# Patient Record
Sex: FEMALE | Race: OTHER | Employment: FULL TIME | URBAN - METROPOLITAN AREA
[De-identification: names, ages, dates, MRNs, and addresses within clinical notes are randomized per-mention and may not be internally consistent; named-entity substitution may affect disease eponyms.]

---

## 2018-05-15 ENCOUNTER — OFFICE VISIT (OUTPATIENT)
Dept: OBGYN CLINIC | Facility: CLINIC | Age: 43
End: 2018-05-15

## 2018-05-15 VITALS
BODY MASS INDEX: 33.26 KG/M2 | HEIGHT: 59 IN | DIASTOLIC BLOOD PRESSURE: 64 MMHG | SYSTOLIC BLOOD PRESSURE: 118 MMHG | WEIGHT: 165 LBS

## 2018-05-15 DIAGNOSIS — Z12.39 BREAST CANCER SCREENING: ICD-10-CM

## 2018-05-15 DIAGNOSIS — R10.2 PELVIC PAIN: Primary | ICD-10-CM

## 2018-05-15 PROCEDURE — 87086 URINE CULTURE/COLONY COUNT: CPT | Performed by: OBSTETRICS & GYNECOLOGY

## 2018-05-15 PROCEDURE — 87624 HPV HI-RISK TYP POOLED RSLT: CPT | Performed by: OBSTETRICS & GYNECOLOGY

## 2018-05-15 PROCEDURE — 88175 CYTOPATH C/V AUTO FLUID REDO: CPT | Performed by: OBSTETRICS & GYNECOLOGY

## 2018-05-15 PROCEDURE — 81001 URINALYSIS AUTO W/SCOPE: CPT | Performed by: OBSTETRICS & GYNECOLOGY

## 2018-05-15 PROCEDURE — 99204 OFFICE O/P NEW MOD 45 MIN: CPT | Performed by: OBSTETRICS & GYNECOLOGY

## 2018-05-15 NOTE — PROGRESS NOTES
GYN H&P     5/15/2018  10:10 AM    CC: Patient is here for evaluation of pelvic pain. Referred by internet.     HPI: Patient is a 40year old  for episodes pelvic pressure in vagina which has recurred 3 x \"like someone pushing with a fist.\" This fi Delivery Anes PTL Lv   1  02/20/15 25w0d  1 lb 9 oz F NORMAL SPONT  Y VAZQUEZ      Complications:  labor      Obstetric Comments   Cervical incompetence       GYN hx:    Hx Prior Abnormal Pap: Yes  Pap Result Notes: 2017 PAP = ABNORMAL CELLS W BMI 33.33 kg/m²       Exam:   GENERAL: well developed, well nourished, in no apparent distress  SKIN: no rashes, no suspicious lesions  HEENT: normal  NECK: supple; no thyroidmegaly, no adenopathy  BREASTS: symmetrical, nontender, no palpable masses or nod

## 2018-05-17 ENCOUNTER — TELEPHONE (OUTPATIENT)
Dept: OBGYN CLINIC | Facility: CLINIC | Age: 43
End: 2018-05-17

## 2018-05-17 DIAGNOSIS — R10.2 PELVIC PAIN: Primary | ICD-10-CM

## 2018-05-17 RX ORDER — SULFAMETHOXAZOLE AND TRIMETHOPRIM 800; 160 MG/1; MG/1
1 TABLET ORAL 2 TIMES DAILY
Qty: 6 TABLET | Refills: 0 | Status: SHIPPED | OUTPATIENT
Start: 2018-05-17 | End: 2018-05-20

## 2018-05-17 NOTE — TELEPHONE ENCOUNTER
----- Message from Alba Leos MD sent at 5/15/2018 11:06 PM CDT -----  This looks like a UTI. She needs bactrim DS I po bid x 3 days.

## 2018-05-17 NOTE — TELEPHONE ENCOUNTER
Spoke with pt results of abnormal urine given and explained need for treatment. Results of normal pap given to pt.  rx for bactrim sent to pharmacy

## 2018-06-02 ENCOUNTER — HOSPITAL ENCOUNTER (OUTPATIENT)
Dept: MAMMOGRAPHY | Age: 43
Discharge: HOME | End: 2018-06-02
Attending: OBSTETRICS & GYNECOLOGY
Payer: COMMERCIAL

## 2018-06-02 DIAGNOSIS — Z12.39 BREAST CANCER SCREENING: ICD-10-CM

## 2018-06-02 PROCEDURE — 77067 SCR MAMMO BI INCL CAD: CPT | Performed by: OBSTETRICS & GYNECOLOGY

## 2018-06-02 PROCEDURE — 77063 BREAST TOMOSYNTHESIS BI: CPT | Performed by: OBSTETRICS & GYNECOLOGY

## 2018-06-09 ENCOUNTER — HOSPITAL ENCOUNTER (OUTPATIENT)
Dept: ULTRASOUND IMAGING | Age: 43
Discharge: HOME | End: 2018-06-09
Attending: OBSTETRICS & GYNECOLOGY
Payer: COMMERCIAL

## 2018-06-09 DIAGNOSIS — R10.2 PELVIC PAIN: ICD-10-CM

## 2018-06-09 PROCEDURE — 76856 US EXAM PELVIC COMPLETE: CPT | Performed by: OBSTETRICS & GYNECOLOGY

## 2018-06-09 PROCEDURE — 76830 TRANSVAGINAL US NON-OB: CPT | Performed by: OBSTETRICS & GYNECOLOGY

## 2018-06-10 ENCOUNTER — PATIENT MESSAGE (OUTPATIENT)
Dept: OBGYN CLINIC | Facility: CLINIC | Age: 43
End: 2018-06-10

## 2018-06-11 ENCOUNTER — TELEPHONE (OUTPATIENT)
Dept: OBGYN CLINIC | Facility: CLINIC | Age: 43
End: 2018-06-11

## 2018-06-11 RX ORDER — CIPROFLOXACIN 250 MG/1
500 TABLET, FILM COATED ORAL 2 TIMES DAILY
Qty: 6 TABLET | Refills: 0 | Status: SHIPPED | OUTPATIENT
Start: 2018-06-11 | End: 2018-06-14

## 2018-06-11 NOTE — TELEPHONE ENCOUNTER
Called and dw pt She had c/o urinary frequency, pelvic pressure and pain with urination for 1 week. She feels like she has to lay down or else she will have pain. RX sent in for cipro. DW pt if not improved in a week to FU.

## 2018-06-11 NOTE — TELEPHONE ENCOUNTER
From: Michell Campo  To: Walt Valerio MD  Sent: 6/10/2018 7:39 PM CDT  Subject: Test Results Question    I'm not feeling well!  I'm getting a lot of pressure down there (abdominal, pubic and vagina area) I have been lying down for 2 days

## 2018-06-11 NOTE — TELEPHONE ENCOUNTER
Patient had ultrasound on Saturday and was laid up all weekend. She feels a lot of pressure, lower abdominal and vaginal pressure. Unable to go to work today.

## 2018-06-11 NOTE — TELEPHONE ENCOUNTER
I'm not feeling well!  I'm getting a lot of pressure down there (abdominal, pubic and vagina area)  I have been lying down for 2 days.  I'm not going into work tomorrow. Asim Torrez let me know what the ultrasound results are. Michael Revering you!  [phone]. Message s

## 2018-12-11 ENCOUNTER — HOSPITAL ENCOUNTER (OUTPATIENT)
Age: 43
Discharge: HOME | End: 2018-12-11
Attending: FAMILY MEDICINE
Payer: COMMERCIAL

## 2018-12-11 VITALS
DIASTOLIC BLOOD PRESSURE: 76 MMHG | TEMPERATURE: 98 F | RESPIRATION RATE: 20 BRPM | HEIGHT: 59 IN | SYSTOLIC BLOOD PRESSURE: 114 MMHG | WEIGHT: 160 LBS | OXYGEN SATURATION: 97 % | BODY MASS INDEX: 32.25 KG/M2 | HEART RATE: 84 BPM

## 2018-12-11 DIAGNOSIS — R10.2 SUPRAPUBIC PRESSURE: Primary | ICD-10-CM

## 2018-12-11 PROCEDURE — 99214 OFFICE O/P EST MOD 30 MIN: CPT

## 2018-12-11 PROCEDURE — 87086 URINE CULTURE/COLONY COUNT: CPT | Performed by: FAMILY MEDICINE

## 2018-12-11 PROCEDURE — 81003 URINALYSIS AUTO W/O SCOPE: CPT

## 2018-12-11 PROCEDURE — 81025 URINE PREGNANCY TEST: CPT

## 2018-12-11 PROCEDURE — 99203 OFFICE O/P NEW LOW 30 MIN: CPT

## 2018-12-11 NOTE — ED PROVIDER NOTES
Patient presents with:  Urinary Symptoms (urologic)    HPI:     Susan Keenan is a 44year old female who presents with a chief complaint of suprapubic pressure, urgency of urination  Onset of symptoms: 2 days ago  Denies dysuria, hematuria, flank michelle Negative Negative    Blood, Urine Negative Negative    Nitrite Urine Negative Negative    Urobilinogen urine <2.0 <2.0 mg/dL    Leukocyte esterase urine Trace (A) Negative       Diagnosis:    ICD-10-CM    1.  Suprapubic pressure R10.2          Plan:    [place]de

## 2018-12-11 NOTE — ED INITIAL ASSESSMENT (HPI)
Pt to IC with urinary urgency and frequency for 3 days. Occasional abdominal discomfort. Denies fever. Pt requesting note to return to work.

## 2018-12-12 ENCOUNTER — HOSPITAL ENCOUNTER (OUTPATIENT)
Age: 43
Discharge: ANOTHER HOSPITAL | End: 2018-12-12
Payer: COMMERCIAL

## 2018-12-12 VITALS
WEIGHT: 160 LBS | RESPIRATION RATE: 18 BRPM | HEART RATE: 78 BPM | DIASTOLIC BLOOD PRESSURE: 74 MMHG | TEMPERATURE: 98 F | BODY MASS INDEX: 32 KG/M2 | OXYGEN SATURATION: 96 % | SYSTOLIC BLOOD PRESSURE: 113 MMHG

## 2018-12-12 DIAGNOSIS — R10.9 ABDOMINAL PAIN, ACUTE: Primary | ICD-10-CM

## 2018-12-12 PROCEDURE — 99212 OFFICE O/P EST SF 10 MIN: CPT

## 2018-12-12 NOTE — ED PROVIDER NOTES
Patient presents with:  Abdomen/Flank Pain (GI/)      HPI:     Antonio Donato is a 44year old female who presents with a chief complaint of epigastric pain that started a couple days ago. The patient states she has been nauseated.   The nausea is worse after ea Comment: SOCIALLY      Drug use: No      Sexual activity: Yes        Partners: Male    Other Topics      Concerns:        Not on file    Social History Narrative      Lives with male partner who she has been with for 8 year      No h/o abuse      ROS:   Po today.    Follow Up with:  No follow-up provider specified.

## 2019-01-25 ENCOUNTER — OFFICE VISIT (OUTPATIENT)
Dept: OBGYN CLINIC | Facility: CLINIC | Age: 44
End: 2019-01-25
Payer: COMMERCIAL

## 2019-01-25 VITALS
WEIGHT: 163.5 LBS | HEIGHT: 59 IN | BODY MASS INDEX: 32.96 KG/M2 | DIASTOLIC BLOOD PRESSURE: 80 MMHG | SYSTOLIC BLOOD PRESSURE: 120 MMHG

## 2019-01-25 DIAGNOSIS — N90.89 VULVAR LESION: Primary | ICD-10-CM

## 2019-01-25 PROCEDURE — 99214 OFFICE O/P EST MOD 30 MIN: CPT | Performed by: OBSTETRICS & GYNECOLOGY

## 2019-01-25 PROCEDURE — 56405 I&D VULVA/PERINEAL ABSCESS: CPT | Performed by: OBSTETRICS & GYNECOLOGY

## 2019-01-25 RX ORDER — DOXYCYCLINE HYCLATE 100 MG
100 TABLET ORAL 2 TIMES DAILY
Qty: 14 TABLET | Refills: 0 | Status: SHIPPED | OUTPATIENT
Start: 2019-01-25 | End: 2019-02-01

## 2019-01-25 NOTE — PROGRESS NOTES
CC: Patient is here for bump on labia. HPI: Patient is a 44year old  presents with bump on labia for 6 days which originally was hard and now has softened. She had a lot of pain with sitting/walking. Area is still swollen and tender.  She does h Financial resource strain: Not on file      Food insecurity - worry: Not on file      Food insecurity - inability: Not on file      Transportation needs - medical: Not on file      Transportation needs - non-medical: Not on file    Occupational History follicle. Normal appearance with no masses. Normal arterial and venous flow and waveforms are seen in the right ovary. LEFT:                 Normal appearance with no masses. Normal arterial and venous flow and waveforms are seen in the left ovary.

## 2019-07-29 ENCOUNTER — HOSPITAL ENCOUNTER (OUTPATIENT)
Age: 44
Discharge: HOME | End: 2019-07-29
Attending: EMERGENCY MEDICINE
Payer: COMMERCIAL

## 2019-07-29 VITALS
RESPIRATION RATE: 18 BRPM | TEMPERATURE: 97 F | BODY MASS INDEX: 30.24 KG/M2 | SYSTOLIC BLOOD PRESSURE: 131 MMHG | OXYGEN SATURATION: 97 % | HEART RATE: 72 BPM | WEIGHT: 150 LBS | DIASTOLIC BLOOD PRESSURE: 82 MMHG | HEIGHT: 59 IN

## 2019-07-29 DIAGNOSIS — H10.33 ACUTE CONJUNCTIVITIS OF BOTH EYES, UNSPECIFIED ACUTE CONJUNCTIVITIS TYPE: Primary | ICD-10-CM

## 2019-07-29 PROCEDURE — 99213 OFFICE O/P EST LOW 20 MIN: CPT

## 2019-07-29 PROCEDURE — 99214 OFFICE O/P EST MOD 30 MIN: CPT

## 2019-07-29 RX ORDER — POLYMYXIN B SULFATE AND TRIMETHOPRIM 1; 10000 MG/ML; [USP'U]/ML
1 SOLUTION OPHTHALMIC EVERY 6 HOURS
Qty: 10 ML | Refills: 0 | Status: SHIPPED | OUTPATIENT
Start: 2019-07-29 | End: 2019-08-03

## 2019-07-29 RX ORDER — KETOTIFEN FUMARATE 0.35 MG/ML
1 SOLUTION/ DROPS OPHTHALMIC 2 TIMES DAILY
Qty: 1 BOTTLE | Refills: 0 | Status: SHIPPED | OUTPATIENT
Start: 2019-07-29 | End: 2020-02-04

## 2019-07-29 NOTE — ED PROVIDER NOTES
Patient Seen in: [facility] In [address] [place] [address]    History   Patient presents with: Eye Visual Problem (opthalmic)    Stated Complaint: eye problem     HPI    Pt complains of red B/L eye  for 5 days . NO trauma. NO uri symptoms.    no visual and agreed except as otherwise stated in HPI.     Physical Exam     ED Triage Vitals [07/29/19 1707]   /82   Pulse 72   Resp 18   Temp 97.4 °F (36.3 °C)   Temp src Oral   SpO2 97 %   O2 Device None (Room air)       Current:/82   Pulse 72   Temp

## 2019-07-29 NOTE — ED INITIAL ASSESSMENT (HPI)
PT COMPLAINING OF REDNESS IN BOTH EYES SINCE Thursday. PT STATES IT STARTED ON THE RIGHT EYE.  +ITCHINESS AND IRRITATION. NO CONTACTS OR GLASSES.

## 2019-09-16 ENCOUNTER — APPOINTMENT (OUTPATIENT)
Dept: OCCUPATIONAL MEDICINE | Age: 44
End: 2019-09-16
Attending: FAMILY MEDICINE

## 2019-09-16 ENCOUNTER — HOSPITAL ENCOUNTER (OUTPATIENT)
Age: 44
Discharge: HOME | End: 2019-09-16
Attending: EMERGENCY MEDICINE
Payer: COMMERCIAL

## 2019-09-16 VITALS
WEIGHT: 167 LBS | TEMPERATURE: 98 F | RESPIRATION RATE: 18 BRPM | BODY MASS INDEX: 34 KG/M2 | HEART RATE: 71 BPM | DIASTOLIC BLOOD PRESSURE: 80 MMHG | SYSTOLIC BLOOD PRESSURE: 117 MMHG | OXYGEN SATURATION: 99 %

## 2019-09-16 DIAGNOSIS — B35.0 TINEA CAPITIS: Primary | ICD-10-CM

## 2019-09-16 PROCEDURE — 99214 OFFICE O/P EST MOD 30 MIN: CPT

## 2019-09-16 PROCEDURE — 99213 OFFICE O/P EST LOW 20 MIN: CPT

## 2019-09-16 RX ORDER — LORATADINE 10 MG/1
10 TABLET ORAL DAILY
COMMUNITY
End: 2020-02-04

## 2019-09-16 RX ORDER — PREDNISONE 20 MG/1
20 TABLET ORAL DAILY
Qty: 5 TABLET | Refills: 0 | Status: SHIPPED | OUTPATIENT
Start: 2019-09-16 | End: 2019-09-21

## 2019-09-16 NOTE — ED PROVIDER NOTES
Patient Seen in: [facility]mediate [facility] In [address] [place] [address]    History   Patient presents with:  Rash Skin Problem (integumentary)    Stated Complaint: redness/itching to scalp    HPI    Patient is a 60-year-old female with no significant past medical (Oral)   Resp 18   Wt 75.8 kg   LMP 09/11/2019 (Exact Date)   SpO2 99%   Breastfeeding?  No   BMI 33.73 kg/m²         Physical Exam    Constitutional: Well-developed well-nourished in no acute distress  Head: Normocephalic, no swelling or tenderness  Eyes:

## 2019-09-16 NOTE — ED INITIAL ASSESSMENT (HPI)
Pt to IC with itching rash on scalp and back of neck x 3 weeks. States she used hair dye approximately one month ago.

## 2019-09-18 ENCOUNTER — APPOINTMENT (OUTPATIENT)
Dept: OCCUPATIONAL MEDICINE | Age: 44
End: 2019-09-18
Attending: FAMILY MEDICINE

## 2020-02-04 ENCOUNTER — APPOINTMENT (OUTPATIENT)
Dept: CT IMAGING | Age: 45
End: 2020-02-04
Attending: EMERGENCY MEDICINE
Payer: COMMERCIAL

## 2020-02-04 ENCOUNTER — HOSPITAL ENCOUNTER (OUTPATIENT)
Age: 45
Discharge: HOME | End: 2020-02-04
Attending: EMERGENCY MEDICINE
Payer: COMMERCIAL

## 2020-02-04 VITALS
BODY MASS INDEX: 33.26 KG/M2 | OXYGEN SATURATION: 98 % | DIASTOLIC BLOOD PRESSURE: 84 MMHG | WEIGHT: 165 LBS | SYSTOLIC BLOOD PRESSURE: 133 MMHG | RESPIRATION RATE: 16 BRPM | HEIGHT: 59 IN | HEART RATE: 75 BPM | TEMPERATURE: 98 F

## 2020-02-04 DIAGNOSIS — S06.0X0A CONCUSSION WITHOUT LOSS OF CONSCIOUSNESS, INITIAL ENCOUNTER: Primary | ICD-10-CM

## 2020-02-04 PROCEDURE — 99214 OFFICE O/P EST MOD 30 MIN: CPT

## 2020-02-04 PROCEDURE — 70450 CT HEAD/BRAIN W/O DYE: CPT | Performed by: EMERGENCY MEDICINE
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
## 2020-02-04 NOTE — ED NOTES
Dr. Meyers First spoke with patient with final CT result. Idalia RN will be printing d/c papers for patient.
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
## 2020-05-05 ENCOUNTER — APPOINTMENT (OUTPATIENT)
Dept: GENERAL RADIOLOGY | Age: 45
End: 2020-05-05
Attending: EMERGENCY MEDICINE
Payer: COMMERCIAL

## 2020-05-05 ENCOUNTER — HOSPITAL ENCOUNTER (OUTPATIENT)
Age: 45
Discharge: HOME | End: 2020-05-05
Attending: EMERGENCY MEDICINE
Payer: COMMERCIAL

## 2020-05-05 VITALS
OXYGEN SATURATION: 98 % | HEART RATE: 70 BPM | SYSTOLIC BLOOD PRESSURE: 127 MMHG | TEMPERATURE: 98 F | RESPIRATION RATE: 16 BRPM | DIASTOLIC BLOOD PRESSURE: 70 MMHG

## 2020-05-05 DIAGNOSIS — S33.5XXA LUMBAR SPRAIN, INITIAL ENCOUNTER: ICD-10-CM
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
## 2020-05-05 DIAGNOSIS — R73.09 ELEVATED RANDOM BLOOD GLUCOSE LEVEL: ICD-10-CM

## 2020-05-05 PROCEDURE — 82962 GLUCOSE BLOOD TEST: CPT

## 2020-05-05 PROCEDURE — 72100 X-RAY EXAM L-S SPINE 2/3 VWS: CPT | Performed by: EMERGENCY MEDICINE

## 2020-05-05 PROCEDURE — 99213 OFFICE O/P EST LOW 20 MIN: CPT

## 2020-05-05 PROCEDURE — 81025 URINE PREGNANCY TEST: CPT

## 2020-05-05 PROCEDURE — 81002 URINALYSIS NONAUTO W/O SCOPE: CPT
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
## 2020-11-19 ENCOUNTER — HOSPITAL ENCOUNTER (OUTPATIENT)
Age: 45
Discharge: HOME | End: 2020-11-19
Attending: EMERGENCY MEDICINE
Payer: COMMERCIAL

## 2020-11-19 VITALS
BODY MASS INDEX: 33.26 KG/M2 | DIASTOLIC BLOOD PRESSURE: 80 MMHG | OXYGEN SATURATION: 99 % | SYSTOLIC BLOOD PRESSURE: 132 MMHG | HEIGHT: 59 IN | RESPIRATION RATE: 18 BRPM | WEIGHT: 165 LBS | HEART RATE: 83 BPM | TEMPERATURE: 98 F

## 2020-11-19 DIAGNOSIS — Z20.822 ENCOUNTER FOR SCREENING LABORATORY TESTING FOR COVID-19 VIRUS: Primary | ICD-10-CM

## 2020-11-19 PROCEDURE — 99213 OFFICE O/P EST LOW 20 MIN: CPT | Performed by: EMERGENCY MEDICINE

## 2020-11-19 NOTE — ED PROVIDER NOTES
Patient Seen in: [facility] [place]    History   No chief complaint on file. Stated Complaint: congested chest/body aches/wheezing    HPI    Patient here with cough, congestion for 2 days. No travel, no known sick contacts.   Patient denies sig sh 97.7 °F (36.5 °C)   Temp src Temporal   SpO2 99 %   O2 Device None (Room air)       Current:/80   Pulse 83   Temp 97.7 °F (36.5 °C) (Temporal)   Resp 18   Ht 149.9 cm (4' 11\")   Wt 74.8 kg   LMP 04/05/2020 (Exact Date)   SpO2 99%   BMI 33.33 kg/m²

## 2020-11-23 ENCOUNTER — APPOINTMENT (OUTPATIENT)
Dept: GENERAL RADIOLOGY | Age: 45
End: 2020-11-23
Attending: FAMILY MEDICINE
Payer: COMMERCIAL

## 2020-11-23 ENCOUNTER — HOSPITAL ENCOUNTER (OUTPATIENT)
Age: 45
Discharge: HOME | End: 2020-11-23
Attending: FAMILY MEDICINE
Payer: COMMERCIAL

## 2020-11-23 VITALS
TEMPERATURE: 98 F | SYSTOLIC BLOOD PRESSURE: 120 MMHG | WEIGHT: 165 LBS | HEIGHT: 59 IN | RESPIRATION RATE: 18 BRPM | BODY MASS INDEX: 33.26 KG/M2 | DIASTOLIC BLOOD PRESSURE: 81 MMHG | OXYGEN SATURATION: 100 % | HEART RATE: 102 BPM

## 2020-11-23 DIAGNOSIS — R05.9 COUGH: Primary | ICD-10-CM

## 2020-11-23 DIAGNOSIS — J40 BRONCHITIS: ICD-10-CM

## 2020-11-23 PROCEDURE — 99213 OFFICE O/P EST LOW 20 MIN: CPT | Performed by: FAMILY MEDICINE

## 2020-11-23 PROCEDURE — 71046 X-RAY EXAM CHEST 2 VIEWS: CPT | Performed by: FAMILY MEDICINE

## 2020-11-23 RX ORDER — ALBUTEROL SULFATE 90 UG/1
2 AEROSOL, METERED RESPIRATORY (INHALATION) EVERY 4 HOURS PRN
Qty: 1 INHALER | Refills: 0 | Status: SHIPPED | OUTPATIENT
Start: 2020-11-23 | End: 2020-12-23

## 2020-11-23 RX ORDER — BENZONATATE 100 MG/1
100 CAPSULE ORAL 3 TIMES DAILY PRN
Qty: 30 CAPSULE | Refills: 0 | Status: SHIPPED | OUTPATIENT
Start: 2020-11-23 | End: 2020-12-23

## 2020-11-24 NOTE — ED INITIAL ASSESSMENT (HPI)
Pt presents to the IC with c/o chest tightness, sob, wheezing and fatigue. Pt was seen last Thursday and tested for covid with negative results. Pt notes she feels the same, no worse and no better.

## 2020-11-24 NOTE — ED PROVIDER NOTES
Patient Seen in: [facility] [place]      History   Patient presents with:  Chest Pain    Stated Complaint: Chest tightness    HPI    Pt is a 42 yo with a viral syndrome. Negative for covid. Last week. Now has some chest tightness at times.  Not taking intact. Pupils: Pupils are equal, round, and reactive to light. Neck:      Musculoskeletal: Normal range of motion and neck supple. Cardiovascular:      Rate and Rhythm: Normal rate and regular rhythm. Heart sounds: Normal heart sounds.    Pul

## 2021-08-11 ENCOUNTER — APPOINTMENT (OUTPATIENT)
Dept: CT IMAGING | Age: 46
End: 2021-08-11
Attending: PHYSICIAN ASSISTANT
Payer: COMMERCIAL

## 2021-08-11 ENCOUNTER — HOSPITAL ENCOUNTER (OUTPATIENT)
Age: 46
Discharge: HOME | End: 2021-08-11
Attending: PHYSICIAN ASSISTANT
Payer: COMMERCIAL

## 2021-08-11 VITALS
DIASTOLIC BLOOD PRESSURE: 80 MMHG | SYSTOLIC BLOOD PRESSURE: 122 MMHG | RESPIRATION RATE: 18 BRPM | BODY MASS INDEX: 32.25 KG/M2 | WEIGHT: 160 LBS | HEART RATE: 87 BPM | OXYGEN SATURATION: 100 % | HEIGHT: 59 IN | TEMPERATURE: 98 F

## 2021-08-11 DIAGNOSIS — R93.5 ABNORMAL ABDOMINAL CT SCAN: ICD-10-CM

## 2021-08-11 DIAGNOSIS — Z87.898 HISTORY OF URINARY RETENTION: ICD-10-CM

## 2021-08-11 DIAGNOSIS — R10.9 ABDOMINAL PAIN, ACUTE: Primary | ICD-10-CM

## 2021-08-11 LAB
#MXD IC: 0.6 X10ˆ3/UL (ref 0.1–1)
B-HCG UR QL: NEGATIVE
BILIRUB UR QL STRIP: NEGATIVE
BUN BLD-MCNC: 14 MG/DL (ref 7–18)
CHLORIDE BLD-SCNC: 102 MMOL/L (ref 98–112)
CLARITY UR: CLEAR
CO2 BLD-SCNC: 23 MMOL/L (ref 21–32)
COLOR UR: YELLOW
CREAT BLD-MCNC: 0.6 MG/DL
GLUCOSE BLD-MCNC: 171 MG/DL (ref 70–99)
GLUCOSE UR STRIP-MCNC: NEGATIVE MG/DL
HCT VFR BLD AUTO: 42.5 %
HCT VFR BLD CALC: 46 %
HGB BLD-MCNC: 14.1 G/DL
HGB UR QL STRIP: NEGATIVE
ISTAT IONIZED CALCIUM FOR CHEM 8: 1.2 MMOL/L (ref 1.12–1.32)
KETONES UR STRIP-MCNC: NEGATIVE MG/DL
LEUKOCYTE ESTERASE UR QL STRIP: NEGATIVE
LYMPHOCYTES # BLD AUTO: 3 X10ˆ3/UL (ref 1–4)
LYMPHOCYTES NFR BLD AUTO: 37.9 %
MCH RBC QN AUTO: 29.9 PG (ref 26–34)
MCHC RBC AUTO-ENTMCNC: 33.2 G/DL (ref 31–37)
MCV RBC AUTO: 90 FL (ref 80–100)
MIXED CELL %: 7.8 %
NEUTROPHILS # BLD AUTO: 4.2 X10ˆ3/UL (ref 1.5–7.7)
NEUTROPHILS NFR BLD AUTO: 54.3 %
NITRITE UR QL STRIP: NEGATIVE
PH UR STRIP: 5.5 [PH]
PLATELET # BLD AUTO: 198 X10ˆ3/UL (ref 150–450)
POTASSIUM BLD-SCNC: 4.2 MMOL/L (ref 3.6–5.1)
PROT UR STRIP-MCNC: NEGATIVE MG/DL
RBC # BLD AUTO: 4.72 X10ˆ6/UL
SODIUM BLD-SCNC: 138 MMOL/L (ref 136–145)
SP GR UR STRIP: 1.02
UROBILINOGEN UR STRIP-ACNC: <2 MG/DL
WBC # BLD AUTO: 7.8 X10ˆ3/UL (ref 4–11)

## 2021-08-11 PROCEDURE — 74177 CT ABD & PELVIS W/CONTRAST: CPT | Performed by: PHYSICIAN ASSISTANT

## 2021-08-11 PROCEDURE — 81002 URINALYSIS NONAUTO W/O SCOPE: CPT | Performed by: PHYSICIAN ASSISTANT

## 2021-08-11 PROCEDURE — 85025 COMPLETE CBC W/AUTO DIFF WBC: CPT | Performed by: PHYSICIAN ASSISTANT

## 2021-08-11 PROCEDURE — 99213 OFFICE O/P EST LOW 20 MIN: CPT | Performed by: PHYSICIAN ASSISTANT

## 2021-08-11 PROCEDURE — 36415 COLL VENOUS BLD VENIPUNCTURE: CPT | Performed by: PHYSICIAN ASSISTANT

## 2021-08-11 PROCEDURE — 81025 URINE PREGNANCY TEST: CPT | Performed by: PHYSICIAN ASSISTANT

## 2021-08-11 PROCEDURE — 80047 BASIC METABLC PNL IONIZED CA: CPT | Performed by: PHYSICIAN ASSISTANT

## 2021-08-11 NOTE — ED PROVIDER NOTES
Patient Seen in: [facility] [place]    History   Patient presents with:  Urinary Symptoms    Stated Complaint: abd pain/diff urinating/one kidney    HPI    Sherron Matthews is a 41year old female who presents with chief complaint of suprapubic and Smokeless tobacco: Never Used    Vaping Use      Vaping Use: Never used    Alcohol use: Yes      Comment: SOCIALLY    Drug use: No      Review of Systems    Positive for stated complaint: abd pain/diff urinating/one kidney  Other systems are as noted in H lymphadenopathy noted. Musculoskeletal: Back - Normal to inspection. Nontender to palpation. No CVA tenderness bilaterally. No vertebral point tenderness. Full range of motion without reported pain. No palpable muscle spasm.   Negative straight leg ra exam remained stable over serial reexaminations as previously documented. 8 cc urine obtained via post void straight cath. Results reviewed with patient. Patient states her symptoms have improved prior to discharge.     I have given the patient instructi

## 2022-01-12 ENCOUNTER — APPOINTMENT (OUTPATIENT)
Dept: GENERAL RADIOLOGY | Age: 47
End: 2022-01-12
Attending: NURSE PRACTITIONER
Payer: COMMERCIAL

## 2022-01-12 ENCOUNTER — HOSPITAL ENCOUNTER (OUTPATIENT)
Age: 47
Discharge: HOME | End: 2022-01-12
Payer: COMMERCIAL

## 2022-01-12 VITALS
SYSTOLIC BLOOD PRESSURE: 118 MMHG | TEMPERATURE: 98 F | OXYGEN SATURATION: 98 % | RESPIRATION RATE: 20 BRPM | HEART RATE: 82 BPM | DIASTOLIC BLOOD PRESSURE: 63 MMHG

## 2022-01-12 DIAGNOSIS — Z20.822 LAB TEST NEGATIVE FOR COVID-19 VIRUS: ICD-10-CM

## 2022-01-12 DIAGNOSIS — R06.02 SHORTNESS OF BREATH: ICD-10-CM

## 2022-01-12 DIAGNOSIS — R07.9 CHEST PAIN OF UNCERTAIN ETIOLOGY: Primary | ICD-10-CM

## 2022-01-12 LAB
#MXD IC: 0.5 X10ˆ3/UL (ref 0.1–1)
BUN BLD-MCNC: 15 MG/DL (ref 7–18)
CHLORIDE BLD-SCNC: 102 MMOL/L (ref 98–112)
CO2 BLD-SCNC: 23 MMOL/L (ref 21–32)
CREAT BLD-MCNC: 0.7 MG/DL
DDIMER WHOLE BLOOD: <200 NG/ML DDU (ref ?–400)
GLUCOSE BLD-MCNC: 146 MG/DL (ref 70–99)
HCT VFR BLD AUTO: 42.3 %
HCT VFR BLD CALC: 46 %
HGB BLD-MCNC: 14.2 G/DL
ISTAT IONIZED CALCIUM FOR CHEM 8: 1.17 MMOL/L (ref 1.12–1.32)
LYMPHOCYTES # BLD AUTO: 4.1 X10ˆ3/UL (ref 1–4)
LYMPHOCYTES NFR BLD AUTO: 48.8 %
MCH RBC QN AUTO: 30.3 PG (ref 26–34)
MCHC RBC AUTO-ENTMCNC: 33.6 G/DL (ref 31–37)
MCV RBC AUTO: 90.2 FL (ref 80–100)
MIXED CELL %: 5.9 %
NEUTROPHILS # BLD AUTO: 3.8 X10ˆ3/UL (ref 1.5–7.7)
NEUTROPHILS NFR BLD AUTO: 45.3 %
PLATELET # BLD AUTO: 197 X10ˆ3/UL (ref 150–450)
POTASSIUM BLD-SCNC: 3.5 MMOL/L (ref 3.6–5.1)
RBC # BLD AUTO: 4.69 X10ˆ6/UL
SARS-COV-2 RNA RESP QL NAA+PROBE: NOT DETECTED
SODIUM BLD-SCNC: 138 MMOL/L (ref 136–145)
TROPONIN I BLD-MCNC: <0.02 NG/ML
WBC # BLD AUTO: 8.4 X10ˆ3/UL (ref 4–11)

## 2022-01-12 PROCEDURE — 85378 FIBRIN DEGRADE SEMIQUANT: CPT | Performed by: NURSE PRACTITIONER

## 2022-01-12 PROCEDURE — 93005 ELECTROCARDIOGRAM TRACING: CPT

## 2022-01-12 PROCEDURE — 99215 OFFICE O/P EST HI 40 MIN: CPT

## 2022-01-12 PROCEDURE — 84484 ASSAY OF TROPONIN QUANT: CPT

## 2022-01-12 PROCEDURE — 85025 COMPLETE CBC W/AUTO DIFF WBC: CPT | Performed by: NURSE PRACTITIONER

## 2022-01-12 PROCEDURE — 99214 OFFICE O/P EST MOD 30 MIN: CPT

## 2022-01-12 PROCEDURE — 71046 X-RAY EXAM CHEST 2 VIEWS: CPT | Performed by: NURSE PRACTITIONER

## 2022-01-12 PROCEDURE — 80047 BASIC METABLC PNL IONIZED CA: CPT

## 2022-01-12 PROCEDURE — 93010 ELECTROCARDIOGRAM REPORT: CPT

## 2022-01-12 PROCEDURE — 36415 COLL VENOUS BLD VENIPUNCTURE: CPT

## 2022-01-12 PROCEDURE — 93010 ELECTROCARDIOGRAM REPORT: CPT | Performed by: NURSE PRACTITIONER

## 2022-01-12 RX ORDER — POTASSIUM CHLORIDE 20 MEQ/1
20 TABLET, EXTENDED RELEASE ORAL ONCE
Status: COMPLETED | OUTPATIENT
Start: 2022-01-12 | End: 2022-01-12

## 2022-01-13 NOTE — ED PROVIDER NOTES
Patient Seen in: [facility] [place]      History   Patient presents with:  Chest Pain    Stated Complaint: chest pain, trouble breathing- shortness of breath     Subjective:   HPI    This is a 81-year-old female presenting with chest pain and shortne (36.6 °C)   Temp src Temporal   SpO2 98 %   O2 Device None (Room air)       Current:/63   Pulse 82   Temp 97.8 °F (36.6 °C) (Temporal)   Resp 20   SpO2 98%         Physical Exam  Vitals and nursing note reviewed.    Constitutional:       Appearance: N (CPT=71046)    Result Date: 1/12/2022  CONCLUSION: Normal examination.      Dictated by (CST): Gosia Erickson MD on 1/12/2022 at 6:24 PM     Finalized by (CST): Gosia Erickson MD on 1/12/2022 at 6:25 PM                 Heart Score:    HEART Score      Title breath  Lab test negative for COVID-19 virus     Disposition:  Discharge  1/12/2022  6:32 pm    Follow-up:  Diane [facility].  [phone]    Schedule an appointment as soon as possible for a visit in 3 days

## 2024-02-02 ENCOUNTER — HOSPITAL ENCOUNTER (OUTPATIENT)
Age: 49
Discharge: HOME | End: 2024-02-02
Payer: COMMERCIAL

## 2024-02-02 ENCOUNTER — APPOINTMENT (OUTPATIENT)
Dept: GENERAL RADIOLOGY | Age: 49
End: 2024-02-02
Attending: NURSE PRACTITIONER
Payer: COMMERCIAL

## 2024-02-02 VITALS
HEART RATE: 91 BPM | OXYGEN SATURATION: 100 % | SYSTOLIC BLOOD PRESSURE: 121 MMHG | RESPIRATION RATE: 20 BRPM | DIASTOLIC BLOOD PRESSURE: 76 MMHG | TEMPERATURE: 98 F

## 2024-02-02 DIAGNOSIS — M53.3 TAIL BONE PAIN: Primary | ICD-10-CM

## 2024-02-02 PROCEDURE — 72100 X-RAY EXAM L-S SPINE 2/3 VWS: CPT | Performed by: NURSE PRACTITIONER

## 2024-02-02 PROCEDURE — 99213 OFFICE O/P EST LOW 20 MIN: CPT

## 2024-02-02 PROCEDURE — 99214 OFFICE O/P EST MOD 30 MIN: CPT

## 2024-02-02 PROCEDURE — 72220 X-RAY EXAM SACRUM TAILBONE: CPT | Performed by: NURSE PRACTITIONER

## 2024-02-02 RX ORDER — LIDOCAINE 50 MG/G
1 PATCH TOPICAL EVERY 24 HOURS
Qty: 10 PATCH | Refills: 0 | Status: SHIPPED | OUTPATIENT
Start: 2024-02-02 | End: 2024-02-12

## 2024-02-02 RX ORDER — ACETAMINOPHEN AND CODEINE PHOSPHATE 300; 30 MG/1; MG/1
1 TABLET ORAL EVERY 6 HOURS PRN
Qty: 16 TABLET | Refills: 0 | Status: SHIPPED | OUTPATIENT
Start: 2024-02-02 | End: 2024-02-06

## 2024-02-02 NOTE — ED PROVIDER NOTES
Patient Seen in: [facility] [place]      History     Chief Complaint   Patient presents with    Pain     Stated Complaint: tail bone pain due to fall    Subjective: This is a 48-year-old female, past medical history of seasonal allergies, fibroids, fatty liver, degenerative kidney atrophy, borderline diabetes, presents to immediate care after she fell down stairs 3 weeks ago.  Patient states she has since been with tailbone pain.  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  She denies any exacerbating sciatica pain, numbness, tingling, weakness.  She states her sciatica is left-sided.  She currently has no numbness tingling weakness pain to lower extremities.  No loss of bowel or bladder.  No saddle anesthesia.  Patient taking Tylenol and applying ice to moderate alleviation of symptoms.  Patient ambulatory into immediate care, gait steady, no ataxia or balance.  AOx4.  The history is provided by the patient.           Objective:   No pertinent past medical history.            No pertinent past surgical history.              No pertinent social history.            Review of Systems   Constitutional: Negative.    Gastrointestinal: Negative.    Genitourinary: Negative.    Musculoskeletal:  Positive for arthralgias, back pain and myalgias. Negative for gait problem and joint swelling.   Skin: Negative.    Neurological: Negative.        Positive for stated complaint: tail bone pain due to fall  Other systems are as noted in HPI.  Constitutional and vital signs reviewed.      All other systems reviewed and negative except as noted above.    Physical Exam     ED Triage Vitals [02/02/24 1624]   /76   Pulse 91   Resp 20   Temp 97.6 °F (36.4 °C)   Temp src Temporal   SpO2 100 %   O2 Device None (Room air)       Current:/76   Pulse 91   Temp 97.6 °F (36.4 °C) (Temporal)   Resp 20   SpO2 100%          Physical Exam  Constitutional:       General: She is not in acute distress.     Appearance: Normal appearance. She is not ill-appearing or toxic-appearing.   HENT:      Head: Normocephalic.   Musculoskeletal:         General: Tenderness present. No swelling, deformity or signs of injury.      Lumbar back: Tenderness and bony tenderness present. No swelling, signs of trauma or spasms. Positive left straight leg raise test. Negative right straight leg raise test. No scoliosis.        Back:    Skin:     General: Skin is warm and dry.      Capillary Refill: Capillary refill takes less than 2 seconds.      Findings: No bruising.   Neurological:      General: No focal deficit present.      Mental Status: She is alert and oriented to person, place, and time.               ED Course   Labs Reviewed - No data to display  XR LUMBAR SPINE (MIN 2 VIEWS) (CPT=72100)    Result Date: 2/2/2024  CONCLUSION: Normal    Dictated by (CST): Fantasma Rockwell MD on 2/02/2024 at 5:16 PM     Finalized by (CST): aFntasma Rockwell MD on 2/02/2024 at 5:17 PM          XR SACRUM + COCCYX (MIN 2 VIEWS) (CPT=72220)    Result Date: 2/2/2024  CONCLUSION: No acute fracture    Dictated by (CST): Fantasma Rockwell MD on 2/02/2024 at 4:53 PM     Finalized by (CST): Fantasma Rockwell MD on 2/02/2024 at 4:54 PM                          MDM    Differentials considered include: Lumbar compression fracture, lumbar strain, exacerbation of baseline sciatica, lumbar radiculopathy, fracture of sacrum, bruised sacrum.    X-rays obtained, negative for acute fracture.     Patient likely has bruised sacrum/coccyx with lumbar strain.      Patient neurologically intact.  Patient denies any exacerbation of her sciatic pain.  Sensation to light touch and temperature intact distally.  No loss of bowel or bladder.  No saddle anesthesia.  No suspicion for cauda equina.    Patient has degenerative kidney atrophy.  Unable to receive NSAIDs.  Will prescribe Tylenol 3 for severe pain.   Lidocaine patches prescribed as well.  Patient aware to continue with Tylenol for breakthrough pain and application.    Did educate patient that Tylenol 3 has reinjure milligrams of Tylenol in it, therefore taking extra strength Tylenol over-the-counter, just take one 500 mg tablet.    Patient is aware of signs symptoms that warrant further evaluation.                                     Medical Decision Making  Amount and/or Complexity of Data Reviewed  Radiology: ordered and independent interpretation performed.     Details: There is no lumbar fracture or coccyx fracture appreciated on independent rotation of x-ray        Disposition and Plan     Clinical Impression:  1. Tail bone pain         Disposition:  Discharge  2/2/2024  5:21 pm    Follow-up:  Dana Montenegro MD  [address]  [place] [address] [phone]    Call   This is a PCP that you can establish care with.          Medications Prescribed:  Discharge Medication List as of 2/2/2024  5:21 PM        START taking these medications    Details   lidocaine 5 % External Patch Place 1 patch onto the skin daily for 10 days., Normal, Disp-10 patch, R-0      acetaminophen-codeine 300-30 MG Oral Tab Take 1 tablet by mouth every 6 (six) hours as needed for Pain., Normal, Disp-16 tablet, R-0

## 2024-02-02 NOTE — DISCHARGE INSTRUCTIONS
As discussed, x-rays are negative for fracture.  Likely bruise coccyx.  Tylenol 3 prescribed, you may take this medication every 6 hours as needed for pain.  However, you may not work, drink, drive on this medication.  You may take Tylenol, 1 tablet extra strength Tylenol as needed for discomfort.  Please be aware that this medication has about 300 mg of Tylenol in it already.    Lidocaine patches daily as discussed.    You may use ice and or heat.    Please follow-up and establish care with PCP.

## 2024-05-09 ENCOUNTER — APPOINTMENT (OUTPATIENT)
Dept: CT IMAGING | Facility: HOSPITAL | Age: 49
End: 2024-05-09
Attending: STUDENT IN AN ORGANIZED HEALTH CARE EDUCATION/TRAINING PROGRAM

## 2024-05-09 ENCOUNTER — HOSPITAL ENCOUNTER (OUTPATIENT)
Age: 49
Discharge: ANOTHER HOSPITAL | End: 2024-05-09

## 2024-05-09 ENCOUNTER — HOSPITAL ENCOUNTER (EMERGENCY)
Facility: HOSPITAL | Age: 49
Discharge: HOME | End: 2024-05-09
Attending: STUDENT IN AN ORGANIZED HEALTH CARE EDUCATION/TRAINING PROGRAM

## 2024-05-09 VITALS
RESPIRATION RATE: 18 BRPM | OXYGEN SATURATION: 100 % | HEART RATE: 88 BPM | TEMPERATURE: 97 F | SYSTOLIC BLOOD PRESSURE: 124 MMHG | DIASTOLIC BLOOD PRESSURE: 79 MMHG

## 2024-05-09 VITALS
HEART RATE: 74 BPM | OXYGEN SATURATION: 96 % | BODY MASS INDEX: 31 KG/M2 | DIASTOLIC BLOOD PRESSURE: 79 MMHG | SYSTOLIC BLOOD PRESSURE: 117 MMHG | TEMPERATURE: 98 F | WEIGHT: 155 LBS | RESPIRATION RATE: 14 BRPM

## 2024-05-09 DIAGNOSIS — R10.11 RUQ PAIN: Primary | ICD-10-CM

## 2024-05-09 DIAGNOSIS — N30.00 ACUTE CYSTITIS WITHOUT HEMATURIA: Primary | ICD-10-CM

## 2024-05-09 LAB
ALBUMIN SERPL-MCNC: 4.4 G/DL (ref 3.2–4.8)
ALBUMIN/GLOB SERPL: 1.4 {RATIO} (ref 1–2)
ALP LIVER SERPL-CCNC: 77 U/L
ALT SERPL-CCNC: 39 U/L
ANION GAP SERPL CALC-SCNC: 7 MMOL/L (ref 0–18)
AST SERPL-CCNC: 30 U/L (ref ?–34)
BASOPHILS # BLD AUTO: 0.02 X10(3) UL (ref 0–0.2)
BASOPHILS NFR BLD AUTO: 0.3 %
BILIRUB SERPL-MCNC: 0.5 MG/DL (ref 0.3–1.2)
BILIRUB UR QL: NEGATIVE
BUN BLD-MCNC: 13 MG/DL (ref 9–23)
BUN/CREAT SERPL: 16.9 (ref 10–20)
CALCIUM BLD-MCNC: 9.2 MG/DL (ref 8.7–10.4)
CHLORIDE SERPL-SCNC: 106 MMOL/L (ref 98–112)
CO2 SERPL-SCNC: 26 MMOL/L (ref 21–32)
COLOR UR: YELLOW
CREAT BLD-MCNC: 0.77 MG/DL
DEPRECATED RDW RBC AUTO: 44.5 FL (ref 35.1–46.3)
EGFRCR SERPLBLD CKD-EPI 2021: 96 ML/MIN/1.73M2 (ref 60–?)
EOSINOPHIL # BLD AUTO: 0.07 X10(3) UL (ref 0–0.7)
EOSINOPHIL NFR BLD AUTO: 1 %
ERYTHROCYTE [DISTWIDTH] IN BLOOD BY AUTOMATED COUNT: 13.6 % (ref 11–15)
GLOBULIN PLAS-MCNC: 3.1 G/DL (ref 2–3.5)
GLUCOSE BLD-MCNC: 202 MG/DL (ref 70–99)
GLUCOSE UR-MCNC: 100 MG/DL
HCT VFR BLD AUTO: 43.1 %
HGB BLD-MCNC: 14.5 G/DL
IMM GRANULOCYTES # BLD AUTO: 0.02 X10(3) UL (ref 0–1)
IMM GRANULOCYTES NFR BLD: 0.3 %
LEUKOCYTE ESTERASE UR QL STRIP.AUTO: 500
LIPASE SERPL-CCNC: 60 U/L (ref 13–75)
LYMPHOCYTES # BLD AUTO: 2.54 X10(3) UL (ref 1–4)
LYMPHOCYTES NFR BLD AUTO: 37.4 %
MCH RBC QN AUTO: 30.2 PG (ref 26–34)
MCHC RBC AUTO-ENTMCNC: 33.6 G/DL (ref 31–37)
MCV RBC AUTO: 89.8 FL
MONOCYTES # BLD AUTO: 0.53 X10(3) UL (ref 0.1–1)
MONOCYTES NFR BLD AUTO: 7.8 %
NEUTROPHILS # BLD AUTO: 3.61 X10 (3) UL (ref 1.5–7.7)
NEUTROPHILS # BLD AUTO: 3.61 X10(3) UL (ref 1.5–7.7)
NEUTROPHILS NFR BLD AUTO: 53.2 %
NITRITE UR QL STRIP.AUTO: NEGATIVE
OSMOLALITY SERPL CALC.SUM OF ELEC: 294 MOSM/KG (ref 275–295)
PH UR: 5.5 [PH] (ref 5–8)
PLATELET # BLD AUTO: 224 10(3)UL (ref 150–450)
POTASSIUM SERPL-SCNC: 3.6 MMOL/L (ref 3.5–5.1)
PROT SERPL-MCNC: 7.5 G/DL (ref 5.7–8.2)
PROT UR-MCNC: 20 MG/DL
RBC # BLD AUTO: 4.8 X10(6)UL
SODIUM SERPL-SCNC: 139 MMOL/L (ref 136–145)
SP GR UR STRIP: >1.03 (ref 1–1.03)
UROBILINOGEN UR STRIP-ACNC: NORMAL
WBC # BLD AUTO: 6.8 X10(3) UL (ref 4–11)

## 2024-05-09 PROCEDURE — 99285 EMERGENCY DEPT VISIT HI MDM: CPT

## 2024-05-09 PROCEDURE — 74177 CT ABD & PELVIS W/CONTRAST: CPT | Performed by: STUDENT IN AN ORGANIZED HEALTH CARE EDUCATION/TRAINING PROGRAM

## 2024-05-09 PROCEDURE — 85025 COMPLETE CBC W/AUTO DIFF WBC: CPT | Performed by: STUDENT IN AN ORGANIZED HEALTH CARE EDUCATION/TRAINING PROGRAM

## 2024-05-09 PROCEDURE — 81001 URINALYSIS AUTO W/SCOPE: CPT | Performed by: STUDENT IN AN ORGANIZED HEALTH CARE EDUCATION/TRAINING PROGRAM

## 2024-05-09 PROCEDURE — 83690 ASSAY OF LIPASE: CPT | Performed by: STUDENT IN AN ORGANIZED HEALTH CARE EDUCATION/TRAINING PROGRAM

## 2024-05-09 PROCEDURE — 80053 COMPREHEN METABOLIC PANEL: CPT | Performed by: STUDENT IN AN ORGANIZED HEALTH CARE EDUCATION/TRAINING PROGRAM

## 2024-05-09 PROCEDURE — 99213 OFFICE O/P EST LOW 20 MIN: CPT

## 2024-05-09 PROCEDURE — 36415 COLL VENOUS BLD VENIPUNCTURE: CPT

## 2024-05-09 PROCEDURE — 99284 EMERGENCY DEPT VISIT MOD MDM: CPT

## 2024-05-09 PROCEDURE — 87086 URINE CULTURE/COLONY COUNT: CPT | Performed by: STUDENT IN AN ORGANIZED HEALTH CARE EDUCATION/TRAINING PROGRAM

## 2024-05-09 RX ORDER — CEPHALEXIN 500 MG/1
500 CAPSULE ORAL 2 TIMES DAILY
Qty: 14 CAPSULE | Refills: 0 | Status: SHIPPED | OUTPATIENT
Start: 2024-05-09 | End: 2024-05-16

## 2024-05-09 NOTE — ED INITIAL ASSESSMENT (HPI)
Onset of epigastric pain, generalized abdominal cramping, and fever started 3 days ago. Vomited a couple times then developed diarrhea. Fever and body aches resolved but continues to feel \"bloated\".

## 2024-05-09 NOTE — ED PROVIDER NOTES
Patient Seen in: [facility] [place]      History     Chief Complaint   Patient presents with    Fever    Abdominal Pain     Stated Complaint: stomach, fever    Subjective:   Geneva is a 48-year-old female presenting to the immediate care complaining of abdominal pain.  Patient states that 4 days ago she had subjective fever and had some upper abdominal pain.  She states that she has had a few episodes of vomiting throughout the week; she has also had multiple episodes of diarrhea throughout the week.  States that her stomach feels very bloated.  Denies any urinary symptoms.  Denies any vaginal bleeding or discharge.  No concern for STDs.  Patient has no other complaints or concerns.          Objective:   Past Medical History:    Abnormal Pap smear of cervix    Borderline diabetes    hemoglobin A1 was 6.2 4/2018    Degenerative kidney atrophy    R kidney only works 10% working diagnosed on CT scan     Fatty liver    Fibroids    diagnosed with previous pregnancy    H/O seasonal allergies    Human papilloma virus infection    on 2017 pap              Past Surgical History:   Procedure Laterality Date    Colposcopy, cervix w/upper adjacent vagina; w/biopsy(s), cervix      Reduction left      1998    Reduction of large breast      Reduction right      1998                Social History     Socioeconomic History    Marital status: Single   Occupational History    Occupation: occupational therapist     Comment: @ [employer]   Tobacco Use    Smoking status: Some Days    Smokeless tobacco: Never   Vaping Use    Vaping status: Never Used   Substance and Sexual Activity    Alcohol use: Yes     Comment: SOCIALLY    Drug use: No    Sexual activity: Yes     Partners: Male   Social History Narrative    Lives with male partner who she has been with for 8 year    No h/o abuse     Social Determinants of Health      [facility] from [employer]              Review of Systems    Positive  for stated complaint: stomach, fever  Other systems are as noted in HPI.  Constitutional and vital signs reviewed.      All other systems reviewed and negative except as noted above.    Physical Exam     ED Triage Vitals [05/09/24 0835]   /79   Pulse 88   Resp 18   Temp 97 °F (36.1 °C)   Temp src Temporal   SpO2 100 %   O2 Device None (Room air)       Current Vitals:   Vital Signs  BP: 124/79  Pulse: 88  Resp: 18  Temp: 97 °F (36.1 °C)  Temp src: Temporal    Oxygen Therapy  SpO2: 100 %  O2 Device: None (Room air)            Physical Exam  Vitals and nursing note reviewed.   Constitutional:       General: She is not in acute distress.     Appearance: Normal appearance. She is not ill-appearing, toxic-appearing or diaphoretic.   HENT:      Head: Normocephalic.   Cardiovascular:      Rate and Rhythm: Normal rate.   Pulmonary:      Effort: Pulmonary effort is normal. No respiratory distress.   Abdominal:      General: Abdomen is flat. Bowel sounds are normal. There is no distension.      Palpations: Abdomen is soft. There is no mass.      Tenderness: There is abdominal tenderness in the right upper quadrant and left lower quadrant. There is no right CVA tenderness, left CVA tenderness, guarding or rebound.      Hernia: No hernia is present.   Musculoskeletal:         General: Normal range of motion.      Cervical back: Normal range of motion.   Skin:     General: Skin is warm and dry.      Capillary Refill: Capillary refill takes less than 2 seconds.   Neurological:      General: No focal deficit present.      Mental Status: She is alert and oriented to person, place, and time.   Psychiatric:         Mood and Affect: Mood normal.         Behavior: Behavior normal.         Thought Content: Thought content normal.         Judgment: Judgment normal.              ED Course   Labs Reviewed - No data to display         MDM             Medical Decision Making  Multiple medical diagnoses were considered including but not  limited to diverticulitis, colitis, cholecystitis, GERD.  Patient is well appearing, non-toxic and in no acute distress.  Vital signs are stable.   48-year-old female presenting to the immediate care complaining of abdominal pain for the past 4 days.  Fever at the beginning of illness.  Has had diarrhea and vomiting throughout the week.  Patient has tenderness to her right upper quadrant and left lower quadrant on exam.  Given the right upper quadrant tenderness and the likely need for advanced lab testing not available here the patient will be sent to the emergency department for further evaluation and management.  Patient will go to the [place] emergency department.  Patient discussed with Dr. Robb who agrees with this plan.      Amount and/or Complexity of Data Reviewed  Labs: ordered. Decision-making details documented in ED Course.        Disposition and Plan     Clinical Impression:  1. RUQ pain         Disposition:  Ic to ed  5/9/2024  9:05 am    Follow-up:  No follow-up provider specified.        Medications Prescribed:  Discharge Medication List as of 5/9/2024  8:58 AM

## 2024-05-13 NOTE — ED PROVIDER NOTES
Patient Seen in: [facility] Emergency Department      History     Chief Complaint   Patient presents with    Abdominal Pain     Stated Complaint: abddominal pain    Subjective:   HPI    47 yo female, with recent dx of DM on metformin, presenting with abdominal pian. She describes 3 day history of lower abdominal as well as RUQ abdominal discomfort. Associated with body aches. Has had nonbloody diarrhea since initiation of metformin a few weeks ago. No fevers, no flank pain, vomiting, hematuria or vaginal bleeding/discharge. No burning with urination    Objective:   Past Medical History:    Abnormal Pap smear of cervix    Borderline diabetes    hemoglobin A1 was 6.2 4/2018    Degenerative kidney atrophy    R kidney only works 10% working diagnosed on CT scan     Diabetes (HCC)    Fatty liver    Fibroids    diagnosed with previous pregnancy    H/O seasonal allergies    Human papilloma virus infection    on 2017 pap              Past Surgical History:   Procedure Laterality Date    Colposcopy, cervix w/upper adjacent vagina; w/biopsy(s), cervix      Reduction left      1998    Reduction of large breast      Reduction right      1998                Social History     Socioeconomic History    Marital status: Single   Occupational History    Occupation: occupational therapist     Comment: @ [employer]   Tobacco Use    Smoking status: Some Days    Smokeless tobacco: Never   Vaping Use    Vaping status: Never Used   Substance and Sexual Activity    Alcohol use: Yes     Comment: SOCIALLY    Drug use: No    Sexual activity: Yes     Partners: Male   Social History Narrative    Lives with male partner who she has been with for 8 year    No h/o abuse     Social Determinants of Health      [facility] [employer] [employer]    [facility]              Review of Systems    Positive for stated complaint: abddominal pain  Other systems are as noted in HPI.  Constitutional and vital signs reviewed.       All other systems reviewed and negative except as noted above.    Physical Exam     ED Triage Vitals   BP 05/09/24 0925 146/78   Pulse 05/09/24 0925 85   Resp 05/09/24 0925 18   Temp 05/09/24 0925 98 °F (36.7 °C)   Temp src --    SpO2 05/09/24 0925 98 %   O2 Device 05/09/24 0945 None (Room air)       Current Vitals:   No data recorded        Physical Exam    Constitutional: awake, alert, no sig distress  HENT: mmm, no lesions,  Neck: normal range of motion, no tenderness, supple.  Eyes: PERRL, EOMI, conjunctiva normal, no discharge. Sclera anicteric.  Cardiovascular: rr no murmur  Respiratory: Normal breath sounds, no respiratory distress, no wheezing, no chest tenderness.  GI: Bowel sounds normal, Soft, suprapubic, LLQ and RUQ TTP, no masses, no pulsatile masses.  : No CVA tenderness.  Skin: Warm, dry, no erythema, no rash.  Musculoskeletal: Intact distal pulses, no edema, no tenderness, no cyanosis, no clubbing. Good range of motion in all major joints. No tenderness to palpation or major deformities noted. Back- No tenderness.  Neurologic: Alert & oriented x 3, normal motor function, normal sensory function, no focal deficits noted.  Psych: Calm, cooperative, nl affect        ED Course     Labs Reviewed   COMP METABOLIC PANEL (14) - Abnormal; Notable for the following components:       Result Value    Glucose 202 (*)     All other components within normal limits   URINALYSIS WITH CULTURE REFLEX - Abnormal; Notable for the following components:    Clarity Urine Turbid (*)     Spec Gravity >1.030 (*)     Glucose Urine 100 (*)     Ketones Urine Trace (*)     Blood Urine 2+ (*)     Protein Urine 20 (*)     Leukocyte Esterase Urine 500 (*)     WBC Urine 21-50 (*)     RBC Urine 6-10 (*)     Bacteria Urine 1+ (*)     Squamous Epi. Cells Few (*)     All other components within normal limits   LIPASE - Normal   CBC WITH DIFFERENTIAL WITH PLATELET    Narrative:     The following orders were created for panel order  CBC With Differential With Platelet.  Procedure                               Abnormality         Status                     ---------                               -----------         ------                     CBC W/ DIFFERENTIAL[[id number]]                              Final result                 Please view results for these tests on the individual orders.   RAINBOW DRAW BLUE   URINE CULTURE, ROUTINE   CBC W/ DIFFERENTIAL          ED Course as of 05/13/24 1556  ------------------------------------------------------------  Time: 05/09 1155  Comment: I have independently reviewed patient's CT abdomen and pelvis, I do not appreciate any acute intra-abdominal pathology.              MDM      48F hx as above presenting with lower and RUQ pain. On arrival, VSS, reassuring.   Ddx: Cystitis, diverticulitis, adverse reaction to metformin, sx cholelithiasis or acute cholecystitis  Lower suspicion for gynecologic pathology such as torsion, PID/toa at this time but considered at length.   Plan: labs, CT w/ con, UA, LFTs, Lipase    Labs with WBC 6.8, normal lfts and lipase, UA with 3+ LE, 21-50 WBC 1+ bacteria -  query cystitis, will start on keflex.   Discussed return precautions and follow up instructions with patient who voiced understanding and agreement with the plan. All questions answered.                                  [facility]    Disposition and Plan     Clinical Impression:  1. Acute cystitis without hematuria         Disposition:  Discharge  5/9/2024 11:56 am    Follow-up:  [facility] Emergency Department  [address] [facility] [id number] [phone]  Follow up  As needed, If symptoms worsen    Donnie Bird MD  [address] [facility] [id number] [phone]    Follow up            Medications Prescribed:  Discharge Medication List as of 5/9/2024 12:00 PM        START taking these medications    Details   cephalexin 500 MG Oral Cap Take 1 capsule (500 mg total) by mouth 2 (two) times daily  for 7 days., Normal, Disp-14 capsule, R-0

## 2024-06-12 ENCOUNTER — HOSPITAL ENCOUNTER (OUTPATIENT)
Age: 49
Discharge: ANOTHER HOSPITAL | End: 2024-06-12
Attending: STUDENT IN AN ORGANIZED HEALTH CARE EDUCATION/TRAINING PROGRAM
Payer: COMMERCIAL

## 2024-06-12 ENCOUNTER — HOSPITAL ENCOUNTER (EMERGENCY)
Facility: HOSPITAL | Age: 49
Discharge: HOME | End: 2024-06-12
Attending: EMERGENCY MEDICINE
Payer: COMMERCIAL

## 2024-06-12 VITALS
WEIGHT: 155 LBS | SYSTOLIC BLOOD PRESSURE: 114 MMHG | DIASTOLIC BLOOD PRESSURE: 75 MMHG | RESPIRATION RATE: 16 BRPM | TEMPERATURE: 97 F | HEART RATE: 68 BPM | BODY MASS INDEX: 31 KG/M2 | OXYGEN SATURATION: 98 %

## 2024-06-12 VITALS
SYSTOLIC BLOOD PRESSURE: 125 MMHG | HEART RATE: 87 BPM | TEMPERATURE: 97 F | OXYGEN SATURATION: 98 % | DIASTOLIC BLOOD PRESSURE: 88 MMHG | RESPIRATION RATE: 18 BRPM

## 2024-06-12 DIAGNOSIS — R10.30 LOWER ABDOMINAL PAIN: Primary | ICD-10-CM

## 2024-06-12 DIAGNOSIS — R82.4 KETONURIA: ICD-10-CM

## 2024-06-12 DIAGNOSIS — R73.9 HYPERGLYCEMIA: Primary | ICD-10-CM

## 2024-06-12 DIAGNOSIS — R10.9 ABDOMINAL PAIN, ACUTE: ICD-10-CM

## 2024-06-12 LAB
ANION GAP SERPL CALC-SCNC: 6 MMOL/L (ref 0–18)
B-HCG UR QL: NEGATIVE
BASOPHILS # BLD AUTO: 0.04 X10(3) UL (ref 0–0.2)
BASOPHILS NFR BLD AUTO: 0.6 %
BILIRUB UR QL STRIP: NEGATIVE
BILIRUB UR QL: NEGATIVE
BUN BLD-MCNC: 9 MG/DL (ref 9–23)
BUN/CREAT SERPL: 13 (ref 10–20)
CALCIUM BLD-MCNC: 9.6 MG/DL (ref 8.7–10.4)
CHLORIDE SERPL-SCNC: 105 MMOL/L (ref 98–112)
CLARITY UR: CLEAR
CO2 SERPL-SCNC: 25 MMOL/L (ref 21–32)
COLOR UR: COLORLESS
COLOR UR: YELLOW
CREAT BLD-MCNC: 0.69 MG/DL
DEPRECATED RDW RBC AUTO: 44.1 FL (ref 35.1–46.3)
EGFRCR SERPLBLD CKD-EPI 2021: 108 ML/MIN/1.73M2 (ref 60–?)
EOSINOPHIL # BLD AUTO: 0.08 X10(3) UL (ref 0–0.7)
EOSINOPHIL NFR BLD AUTO: 1.2 %
ERYTHROCYTE [DISTWIDTH] IN BLOOD BY AUTOMATED COUNT: 13.6 % (ref 11–15)
GLUCOSE BLD-MCNC: 186 MG/DL (ref 70–99)
GLUCOSE BLDC GLUCOMTR-MCNC: 226 MG/DL (ref 70–99)
GLUCOSE BLDC GLUCOMTR-MCNC: 226 MG/DL (ref 70–99)
GLUCOSE UR STRIP-MCNC: 250 MG/DL
GLUCOSE UR-MCNC: 100 MG/DL
HCT VFR BLD AUTO: 41.4 %
HGB BLD-MCNC: 14.4 G/DL
HGB UR QL STRIP.AUTO: NEGATIVE
HGB UR QL STRIP: NEGATIVE
IMM GRANULOCYTES # BLD AUTO: 0.02 X10(3) UL (ref 0–1)
IMM GRANULOCYTES NFR BLD: 0.3 %
KETONES UR STRIP-MCNC: 15 MG/DL
KETONES UR-MCNC: NEGATIVE MG/DL
LEUKOCYTE ESTERASE UR QL STRIP.AUTO: NEGATIVE
LEUKOCYTE ESTERASE UR QL STRIP: NEGATIVE
LYMPHOCYTES # BLD AUTO: 2.94 X10(3) UL (ref 1–4)
LYMPHOCYTES NFR BLD AUTO: 43.6 %
MCH RBC QN AUTO: 30.8 PG (ref 26–34)
MCHC RBC AUTO-ENTMCNC: 34.8 G/DL (ref 31–37)
MCV RBC AUTO: 88.7 FL
MONOCYTES # BLD AUTO: 0.46 X10(3) UL (ref 0.1–1)
MONOCYTES NFR BLD AUTO: 6.8 %
NEUTROPHILS # BLD AUTO: 3.2 X10 (3) UL (ref 1.5–7.7)
NEUTROPHILS # BLD AUTO: 3.2 X10(3) UL (ref 1.5–7.7)
NEUTROPHILS NFR BLD AUTO: 47.5 %
NITRITE UR QL STRIP.AUTO: NEGATIVE
NITRITE UR QL STRIP: NEGATIVE
OSMOLALITY SERPL CALC.SUM OF ELEC: 286 MOSM/KG (ref 275–295)
PH UR STRIP: 7 [PH]
PH UR: 6 [PH] (ref 5–8)
PLATELET # BLD AUTO: 239 10(3)UL (ref 150–450)
POTASSIUM SERPL-SCNC: 4 MMOL/L (ref 3.5–5.1)
PROT UR STRIP-MCNC: NEGATIVE MG/DL
PROT UR-MCNC: NEGATIVE MG/DL
RBC # BLD AUTO: 4.67 X10(6)UL
SODIUM SERPL-SCNC: 136 MMOL/L (ref 136–145)
SP GR UR STRIP: 1.02
SP GR UR STRIP: <1.005 (ref 1–1.03)
UROBILINOGEN UR STRIP-ACNC: <2 MG/DL
UROBILINOGEN UR STRIP-ACNC: NORMAL
WBC # BLD AUTO: 6.7 X10(3) UL (ref 4–11)

## 2024-06-12 PROCEDURE — 85025 COMPLETE CBC W/AUTO DIFF WBC: CPT | Performed by: EMERGENCY MEDICINE

## 2024-06-12 PROCEDURE — 99213 OFFICE O/P EST LOW 20 MIN: CPT

## 2024-06-12 PROCEDURE — 99284 EMERGENCY DEPT VISIT MOD MDM: CPT

## 2024-06-12 PROCEDURE — 82962 GLUCOSE BLOOD TEST: CPT

## 2024-06-12 PROCEDURE — 81025 URINE PREGNANCY TEST: CPT

## 2024-06-12 PROCEDURE — 96360 HYDRATION IV INFUSION INIT: CPT

## 2024-06-12 PROCEDURE — 80048 BASIC METABOLIC PNL TOTAL CA: CPT | Performed by: EMERGENCY MEDICINE

## 2024-06-12 PROCEDURE — 81002 URINALYSIS NONAUTO W/O SCOPE: CPT

## 2024-06-12 PROCEDURE — 81003 URINALYSIS AUTO W/O SCOPE: CPT | Performed by: EMERGENCY MEDICINE

## 2024-06-12 RX ORDER — DICYCLOMINE HYDROCHLORIDE 10 MG/1
10 CAPSULE ORAL ONCE
Status: COMPLETED | OUTPATIENT
Start: 2024-06-12 | End: 2024-06-12

## 2024-06-12 RX ORDER — DICYCLOMINE HCL 20 MG
20 TABLET ORAL 4 TIMES DAILY PRN
Qty: 30 TABLET | Refills: 0 | Status: SHIPPED | OUTPATIENT
Start: 2024-06-12 | End: 2024-07-12

## 2024-06-12 NOTE — ED PROVIDER NOTES
Patient Seen in: [facility]      History     Chief Complaint   Patient presents with    Urinary Symptoms     Stated Complaint: Uti    Subjective:   HPI    48-year-old female with past medical history of degenerative right kidney but reportedly normal kidney function and DM, who presents with concern for a few days of diffuse B/L lower abdominal pain and pressure which is consistent with previous UTI.  She denies dysuria urgency or fevers or vomiting.  She states that time she does sometimes forget to take her nightly dose of metformin.  She states she only ate popcorn last night and has not eaten anything since.  She denies any current vomiting.  She states she has had decreased oral intake as water makes her feel bloated. Of note, as confirmed by the patient as well as per chart review, the patient was assessed in the emergency department on 5/9/2024 for similar symptoms with CT scan only remarkable for incidental findings, otherwise unremarkable, and urine concerning for infection.  Patient was initiated on Keflex and patient reports resolution of her symptoms.  However, there was no growth on her urine culture.    Objective:   Past Medical History:    Abnormal Pap smear of cervix    Borderline diabetes    hemoglobin A1 was 6.2 4/2018    Degenerative kidney atrophy    R kidney only works 10% working diagnosed on CT scan     Diabetes (HCC)    Fatty liver    Fibroids    diagnosed with previous pregnancy    H/O seasonal allergies    Human papilloma virus infection    on 2017 pap              Past Surgical History:   Procedure Laterality Date    Colposcopy, cervix w/upper adjacent vagina; w/biopsy(s), cervix      Reduction left      1998    Reduction of large breast      Reduction right      1998                Social History     Socioeconomic History    Marital status: Single   Occupational History    Occupation: occupational therapist     Comment: @ [employer]   Tobacco Use    Smoking  status: Some Days    Smokeless tobacco: Never   Vaping Use    Vaping status: Never Used   Substance and Sexual Activity    Alcohol use: Yes     Comment: SOCIALLY    Drug use: No    Sexual activity: Yes     Partners: Male   Social History Narrative    Lives with male partner who she has been with for 8 year    No h/o abuse     Social Determinants of Health      [facility]              Review of Systems    Positive for stated complaint: Uti  Other systems are as noted in HPI.  Constitutional and vital signs reviewed.      All other systems reviewed and negative except as noted above.    Physical Exam     ED Triage Vitals [06/12/24 0941]   /88   Pulse 87   Resp 18   Temp 97.4 °F (36.3 °C)   Temp src Temporal   SpO2 98 %   O2 Device None (Room air)       Current Vitals:   Vital Signs  BP: 125/88  Pulse: 87  Resp: 18  Temp: 97.4 °F (36.3 °C)  Temp src: Temporal    Oxygen Therapy  SpO2: 98 %  O2 Device: None (Room air)            Physical Exam    General: Awake, alert, comfortable on room air, in no distress, tolerating oral secretions, interactive but fatigued appearing  Pulmonary: Lungs CTA B, no wheezing, no conversational dyspnea  GI: Abdomen soft, TTP of the suprapubic region and B/L lower quadrants greater on the right lower quadrant, no rebound or guarding  Neuro: symmetrical facial expressions on gross observation  Musculoskeletal: No B/L CVA tenderness  HEENT: no periorbital edema or erythema  Psych: Normal mood, normal affect    ED Course     Labs Reviewed   [facility] POCT URINALYSIS DIPSTICK - Abnormal; Notable for the following components:       Result Value    Urine Clarity Slightly cloudy (*)     Glucose, Urine 250 (*)     Ketone, Urine 15 (*)     All other components within normal limits   POCT GLUCOSE - Abnormal; Notable for the following components:    POC Glucose  226 (*)     All other components within normal limits   POCT PREGNANCY URINE - Normal         MDM    Patient is awake, alert, afebrile, in no distress, nontoxic-appearing she has diffusely tender lower quadrants concerning for possible appendicitis versus diverticulitis versus UTI, but lower suspicion for acute abdomen given on 5/9/2024, per chart review, when patient presented with similar symptoms to the emergency department, she had an unremarkable CT scan other than incidental findings, she was diagnosed with a UTI at that time with reportedly similar symptoms, but urine dip today shows no nitrites and no leukocytes and no blood with no signs of UTI on urine dip today, urine also negative for pregnancy to rule out any pregnancy related complications    Patient has glucosuria as well as ketonuria and Accu-Chek shows blood sugar of 226, given history of DM with patient missing doses of metformin and fatigued appearing with reported abdominal pain but no UTI, concern for possible early DKA, and given limitations of immediate care in assessing and treating DKA, I recommended immediate assessment in the emergency department.  The patient is agreeable and states she will go straight to the [facility] emergency department.      Disposition and Plan     Clinical Impression:  1. Hyperglycemia    2. Ketonuria    3. Abdominal pain, acute         Disposition:  Ic to ed  6/12/2024 10:29 am    Follow-up:  No follow-up provider specified.        Medications Prescribed:  Discharge Medication List as of 6/12/2024 10:30 AM        None

## 2024-06-12 NOTE — ED PROVIDER NOTES
Patient Seen in: [facility] Emergency Department      History     Chief Complaint   Patient presents with    Urinary Symptoms     Stated Complaint: MD concern for early DKA, abdominal pain    Subjective:   HPI        Objective:   Past Medical History:    Abnormal Pap smear of cervix    Borderline diabetes    hemoglobin A1 was 6.2 4/2018    Degenerative kidney atrophy    R kidney only works 10% working diagnosed on CT scan     Diabetes (HCC)    Fatty liver    Fibroids    diagnosed with previous pregnancy    H/O seasonal allergies    Human papilloma virus infection    on 2017 pap              Past Surgical History:   Procedure Laterality Date    Colposcopy, cervix w/upper adjacent vagina; w/biopsy(s), cervix      Reduction left      1998    Reduction of large breast      Reduction right      1998                Social History     Socioeconomic History    Marital status: Single   Occupational History    Occupation: occupational therapist     Comment: @ [employer]   Tobacco Use    Smoking status: Former     Types: Cigarettes    Smokeless tobacco: Never   Vaping Use    Vaping status: Never Used   Substance and Sexual Activity    Alcohol use: Yes     Comment: occ    Drug use: No    Sexual activity: Yes     Partners: Male   Social History Narrative    Lives with male partner who she has been with for 8 year    No h/o abuse     Social Determinants of Health      [facility]              Review of Systems    Positive for stated complaint: MD concern for early DKA, abdominal pain  Other systems are as noted in HPI.  Constitutional and vital signs reviewed.      All other systems reviewed and negative except as noted above.    Physical Exam     ED Triage Vitals [06/12/24 1108]   /71   Pulse 79   Resp 22   Temp 97.4 °F (36.3 °C)   Temp src Temporal   SpO2 97 %   O2 Device None (Room air)       Current Vitals:   Vital Signs  BP: 114/75  Pulse: 68  Resp:  16  Temp: 97.4 °F (36.3 °C)  Temp src: Temporal  MAP (mmHg): 88    Oxygen Therapy  SpO2: 98 %  O2 Device: None (Room air)            Physical Exam          ED Course     Labs Reviewed   URINALYSIS WITH CULTURE REFLEX - Abnormal; Notable for the following components:       Result Value    Urine Color Colorless (*)     Spec Gravity <1.005 (*)     Glucose Urine 100 (*)     All other components within normal limits   BASIC METABOLIC PANEL (8) - Abnormal; Notable for the following components:    Glucose 186 (*)     All other components within normal limits   POCT GLUCOSE - Abnormal; Notable for the following components:    POC Glucose  226 (*)     All other components within normal limits   CBC WITH DIFFERENTIAL WITH PLATELET    Narrative:     The following orders were created for panel order CBC With Differential With Platelet.  Procedure                               Abnormality         Status                     ---------                               -----------         ------                     CBC W/ DIFFERENTIAL[[id number]]                              Final result                 Please view results for these tests on the individual orders.   CBC W/ DIFFERENTIAL                      MDM      48-year-old female with a history of relatively recently diagnosed diabetes presents today with urinary frequency and abdominal discomfort.  Patient states that for the last 3 to 4 days, she has been having some lower abdominal crampy pain.  She is also been having urinary frequency but denies dysuria, fevers, nausea/vomiting, or other symptoms.  She was treated for UTI last month with Keflex which did resolve the symptoms she was having at that time.  Today, presented to an immediate care where urinalysis showed ketonuria and had a blood sugar in the mid 200s and was referred to the ER for rule out DKA.    On exam, vitals normal, well-appearing, abdomen soft with mild lower abdominal tenderness without guarding or  distention.  No CVA tenderness.  Moist mucous membranes.    Differential: UTI, hyperglycemia, enteritis    Labs performed and reassuring.  Normal anion gap and normal bicarb.  Urinalysis shows no signs of infection, ketonuria, but does have some glucose.    In the ED, patient was given IV fluid and Bentyl and on reexamination had significantly improved symptoms.  Will prescribe Bentyl for intestinal spasms at home and instructed on PMD follow-up and return precautions.                           MDM    Disposition and Plan     Clinical Impression:  1. Lower abdominal pain         Disposition:  Discharge  6/12/2024  1:05 pm    Follow-up:  Your primary care doctor    Follow up  As needed          Medications Prescribed:  Discharge Medication List as of 6/12/2024  1:15 PM        START taking these medications    Details   dicyclomine 20 MG Oral Tab Take 1 tablet (20 mg total) by mouth 4 (four) times daily as needed., Normal, Disp-30 tablet, R-0

## 2024-06-12 NOTE — ED INITIAL ASSESSMENT (HPI)
Patient c/o urinary symptoms, frequency, dysuria, lower abdominal pain since Sunday. Patient had a UTI last month. Patient was informed to come to ER as she does not have a UTI but possible DKA per IC.

## 2024-06-12 NOTE — ED INITIAL ASSESSMENT (HPI)
Patient arrives ambulatory with c/o possible UTI. Patient reports suprapubic pain, urinary frequency/retention, back pain. Does report 1 functioning kidney. Patient reports similar symptoms last month and was diagnosed with UTI. Concerned she has another UTI.

## 2025-01-30 ENCOUNTER — HOSPITAL ENCOUNTER (OUTPATIENT)
Age: 50
Discharge: HOME | End: 2025-01-30
Payer: COMMERCIAL

## 2025-01-30 VITALS
SYSTOLIC BLOOD PRESSURE: 120 MMHG | TEMPERATURE: 98 F | HEART RATE: 84 BPM | RESPIRATION RATE: 18 BRPM | OXYGEN SATURATION: 98 % | DIASTOLIC BLOOD PRESSURE: 79 MMHG

## 2025-01-30 DIAGNOSIS — G43.919 INTRACTABLE MIGRAINE WITHOUT STATUS MIGRAINOSUS, UNSPECIFIED MIGRAINE TYPE: Primary | ICD-10-CM

## 2025-01-30 PROCEDURE — 96374 THER/PROPH/DIAG INJ IV PUSH: CPT

## 2025-01-30 PROCEDURE — 96361 HYDRATE IV INFUSION ADD-ON: CPT

## 2025-01-30 PROCEDURE — 99214 OFFICE O/P EST MOD 30 MIN: CPT

## 2025-01-30 PROCEDURE — 96375 TX/PRO/DX INJ NEW DRUG ADDON: CPT

## 2025-01-30 RX ORDER — DIPHENHYDRAMINE HYDROCHLORIDE 50 MG/ML
25 INJECTION INTRAMUSCULAR; INTRAVENOUS ONCE
Status: COMPLETED | OUTPATIENT
Start: 2025-01-30 | End: 2025-01-30

## 2025-01-30 RX ORDER — KETOROLAC TROMETHAMINE 30 MG/ML
15 INJECTION, SOLUTION INTRAMUSCULAR; INTRAVENOUS ONCE
Status: COMPLETED | OUTPATIENT
Start: 2025-01-30 | End: 2025-01-30

## 2025-01-30 RX ORDER — SODIUM CHLORIDE 9 MG/ML
1000 INJECTION, SOLUTION INTRAVENOUS ONCE
Status: COMPLETED | OUTPATIENT
Start: 2025-01-30 | End: 2025-01-30

## 2025-01-30 RX ORDER — ONDANSETRON 2 MG/ML
4 INJECTION INTRAMUSCULAR; INTRAVENOUS ONCE
Status: COMPLETED | OUTPATIENT
Start: 2025-01-30 | End: 2025-01-30

## 2025-01-30 RX ORDER — ONDANSETRON 4 MG/1
4 TABLET, ORALLY DISINTEGRATING ORAL EVERY 4 HOURS PRN
Qty: 10 TABLET | Refills: 0 | Status: SHIPPED | OUTPATIENT
Start: 2025-01-30 | End: 2025-02-06

## 2025-01-30 NOTE — ED PROVIDER NOTES
Patient Seen in: [facility]      History     Chief Complaint   Patient presents with    Migraine     Stated Complaint: Headache; Nausea/Dizziness    Subjective:   HPI    49-year-old female presents with migraine headache.  She states she developed migraine symptoms 1 day ago.  She reports this is her typical migraine pain.  She usually only ever takes Tylenol but there has been no relief.  She does have nausea.  No vomiting.  No head injury.      Objective:     No pertinent past medical history.            No pertinent past surgical history.              No pertinent social history.            Review of Systems    Positive for stated complaint: Headache; Nausea/Dizziness  Other systems are as noted in HPI.  Constitutional and vital signs reviewed.      All other systems reviewed and negative except as noted above.    Physical Exam     ED Triage Vitals [01/30/25 0912]   /79   Pulse 84   Resp 18   Temp 98.1 °F (36.7 °C)   Temp src Oral   SpO2 98 %   O2 Device None (Room air)       Current Vitals:   Vital Signs  BP: 120/79  Pulse: 84  Resp: 18  Temp: 98.1 °F (36.7 °C)  Temp src: Oral    Oxygen Therapy  SpO2: 98 %  O2 Device: None (Room air)        Physical Exam  Vitals reviewed.   Constitutional:       General: She is not in acute distress.     Appearance: She is ill-appearing.   HENT:      Head: Normocephalic.      Nose: Nose normal.      Mouth/Throat:      Mouth: Mucous membranes are moist.   Eyes:      Extraocular Movements: Extraocular movements intact.      Pupils: Pupils are equal, round, and reactive to light.   Cardiovascular:      Rate and Rhythm: Normal rate and regular rhythm.   Pulmonary:      Effort: Pulmonary effort is normal.      Breath sounds: Normal breath sounds.   Musculoskeletal:      Cervical back: Normal range of motion and neck supple.   Skin:     General: Skin is warm and dry.   Neurological:      General: No focal deficit present.      Mental Status: She is alert and  oriented to person, place, and time.   Psychiatric:         Mood and Affect: Mood normal.         Behavior: Behavior normal.             ED Course   Labs Reviewed - No data to display                MDM              Medical Decision Making  41-year-old female presents with migraine headache.  Differential diagnosis includes migraine headache, viral illness.  Patient states this is her typical migraine headache pain.  She states she only ever takes Tylenol and has had no relief.  She has unremarkable exam.  Will treat patient with a migraine cocktail.  1 L normal saline solution infused with Zofran 4 mg IV and Benadryl 25 mg IV and Toradol 15 mg IV.  Patient had good results and left with improved headache.  Prescription for Zofran was sent to her pharmacy.  She was given instructions when to go to the emergency room.    Risk  OTC drugs.  Prescription drug management.        Disposition and Plan     Clinical Impression:  1. Intractable migraine without status migrainosus, unspecified migraine type         Disposition:  Discharge  1/30/2025 10:42 am    Follow-up:  No follow-up provider specified.        Medications Prescribed:  Discharge Medication List as of 1/30/2025 10:45 AM        START taking these medications    Details   ondansetron 4 MG Oral Tablet Dispersible Take 1 tablet (4 mg total) by mouth every 4 (four) hours as needed for Nausea., Normal, Disp-10 tablet, R-0                 Supplementary Documentation:

## 2025-01-30 NOTE — ED INITIAL ASSESSMENT (HPI)
Patient with dizziness, nausea and migraine since yesterday   Patient took Tylenol at home   States her PCP retired and treated her in the past with Toradol injection with good relief in the past

## 2025-04-23 ENCOUNTER — HOSPITAL ENCOUNTER (OUTPATIENT)
Age: 50
Discharge: HOME | End: 2025-04-23
Attending: STUDENT IN AN ORGANIZED HEALTH CARE EDUCATION/TRAINING PROGRAM

## 2025-04-23 ENCOUNTER — APPOINTMENT (OUTPATIENT)
Dept: GENERAL RADIOLOGY | Age: 50
End: 2025-04-23
Attending: STUDENT IN AN ORGANIZED HEALTH CARE EDUCATION/TRAINING PROGRAM

## 2025-04-23 VITALS
DIASTOLIC BLOOD PRESSURE: 80 MMHG | RESPIRATION RATE: 16 BRPM | HEART RATE: 76 BPM | OXYGEN SATURATION: 97 % | SYSTOLIC BLOOD PRESSURE: 130 MMHG | TEMPERATURE: 98 F

## 2025-04-23 DIAGNOSIS — R03.0 ELEVATED BLOOD PRESSURE READING: ICD-10-CM

## 2025-04-23 DIAGNOSIS — R73.9 HYPERGLYCEMIA: ICD-10-CM

## 2025-04-23 DIAGNOSIS — R07.9 ACUTE CHEST PAIN: Primary | ICD-10-CM

## 2025-04-23 LAB
#MXD IC: 0.5 X10ˆ3/UL (ref 0.1–1)
BUN BLD-MCNC: 12 MG/DL (ref 7–18)
CHLORIDE BLD-SCNC: 101 MMOL/L (ref 98–112)
CO2 BLD-SCNC: 23 MMOL/L (ref 21–32)
CREAT BLD-MCNC: 0.5 MG/DL (ref 0.55–1.02)
DDIMER WHOLE BLOOD: <200 NG/ML DDU (ref ?–400)
EGFRCR SERPLBLD CKD-EPI 2021: 116 ML/MIN/1.73M2 (ref 60–?)
GLUCOSE BLD-MCNC: 214 MG/DL (ref 70–99)
HCT VFR BLD AUTO: 42.2 % (ref 35–48)
HCT VFR BLD CALC: 45 % (ref 34–50)
HGB BLD-MCNC: 13.8 G/DL (ref 12–16)
ISTAT IONIZED CALCIUM FOR CHEM 8: 1.19 MMOL/L (ref 1.12–1.32)
LYMPHOCYTES # BLD AUTO: 2.9 X10ˆ3/UL (ref 1–4)
LYMPHOCYTES NFR BLD AUTO: 45.3 %
MCH RBC QN AUTO: 30.1 PG (ref 26–34)
MCHC RBC AUTO-ENTMCNC: 32.7 G/DL (ref 31–37)
MCV RBC AUTO: 91.9 FL (ref 80–100)
MIXED CELL %: 7.5 %
NEUTROPHILS # BLD AUTO: 2.9 X10ˆ3/UL (ref 1.5–7.7)
NEUTROPHILS NFR BLD AUTO: 47.2 %
PLATELET # BLD AUTO: 261 X10ˆ3/UL (ref 150–450)
POTASSIUM BLD-SCNC: 4 MMOL/L (ref 3.6–5.1)
RBC # BLD AUTO: 4.59 X10ˆ6/UL (ref 3.8–5.3)
SODIUM BLD-SCNC: 140 MMOL/L (ref 136–145)
TROPONIN I BLD-MCNC: <0.02 NG/ML (ref ?–0.05)
WBC # BLD AUTO: 6.3 X10ˆ3/UL (ref 4–11)

## 2025-04-23 PROCEDURE — 85378 FIBRIN DEGRADE SEMIQUANT: CPT | Performed by: STUDENT IN AN ORGANIZED HEALTH CARE EDUCATION/TRAINING PROGRAM

## 2025-04-23 PROCEDURE — 84484 ASSAY OF TROPONIN QUANT: CPT

## 2025-04-23 PROCEDURE — 99214 OFFICE O/P EST MOD 30 MIN: CPT

## 2025-04-23 PROCEDURE — 93005 ELECTROCARDIOGRAM TRACING: CPT

## 2025-04-23 PROCEDURE — 36415 COLL VENOUS BLD VENIPUNCTURE: CPT

## 2025-04-23 PROCEDURE — 80047 BASIC METABLC PNL IONIZED CA: CPT

## 2025-04-23 PROCEDURE — 71046 X-RAY EXAM CHEST 2 VIEWS: CPT | Performed by: STUDENT IN AN ORGANIZED HEALTH CARE EDUCATION/TRAINING PROGRAM

## 2025-04-23 PROCEDURE — 99215 OFFICE O/P EST HI 40 MIN: CPT

## 2025-04-23 PROCEDURE — 85025 COMPLETE CBC W/AUTO DIFF WBC: CPT | Performed by: STUDENT IN AN ORGANIZED HEALTH CARE EDUCATION/TRAINING PROGRAM

## 2025-04-23 PROCEDURE — 93010 ELECTROCARDIOGRAM REPORT: CPT

## 2025-04-23 RX ORDER — ACETAMINOPHEN 325 MG/1
650 TABLET ORAL ONCE
Status: COMPLETED | OUTPATIENT
Start: 2025-04-23 | End: 2025-04-23

## 2025-04-23 NOTE — ED PROVIDER NOTES
Patient Seen in: [facility]      History     Chief Complaint   Patient presents with    Chest Pain Angina     Stated Complaint: Chest tightness    Subjective:   HPI    49-year-old female with past medical history of degenerative kidney disease with reported normal kidney function at baseline and diabetes, last dose of metformin was yesterday and waiting for her new prescription, who presents with her  with concern for pain in the left anterior superior chest since 3:30 AM this morning.  She states symptoms woke her up, symptoms are worse with movement of the left upper extremity and with palpation of this region, initially noted along the left inferior breast/inframammary region, but this area has since resolved.  She has mild associated shortness of breath as deep inspiration exacerbates her symptoms.  She denies any personal or family history of cardiac disease.  She denies any recent travel.  She does not take oral birth control or hormone supplementation.  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  She denies any trauma.    Objective:     Past Medical History:    Abnormal Pap smear of cervix    Borderline diabetes    hemoglobin A1 was 6.2 4/2018    Degenerative kidney atrophy    R kidney only works 10% working diagnosed on CT scan     Diabetes (HCC)    Fatty liver    Fibroids    diagnosed with previous pregnancy    H/O seasonal allergies    Human papilloma virus infection    on 2017 pap              Past Surgical History:   Procedure Laterality Date    Colposcopy, cervix w/upper adjacent vagina; w/biopsy(s), cervix      Reduction left      1998    Reduction of large breast      Reduction right      1998                Social History     Socioeconomic History    Marital status: Single   Occupational History    Occupation: occupational therapist     Comment: @ [employer]   Tobacco Use    Smoking status:  Former     Types: Cigarettes    Smokeless tobacco: Never   Vaping Use    Vaping status: Never Used   Substance and Sexual Activity    Alcohol use: Yes     Comment: occ    Drug use: No    Sexual activity: Yes     Partners: Male   Social History Narrative    Lives with male partner who she has been with for 8 year    No h/o abuse     Social Drivers of Health      Received from [facility]              Review of Systems    Positive for stated complaint: Chest tightness  Other systems are as noted in HPI.  Constitutional and vital signs reviewed.      All other systems reviewed and negative except as noted above.                  Physical Exam     ED Triage Vitals [04/23/25 0916]   BP (!) 130/97   Pulse 76   Resp 16   Temp 97.7 °F (36.5 °C)   Temp src Oral   SpO2 97 %   O2 Device None (Room air)       Current Vitals:   Vital Signs  BP: 130/80  Pulse: 76  Resp: 16  Temp: 97.7 °F (36.5 °C)  Temp src: Oral    Oxygen Therapy  SpO2: 97 %  O2 Device: None (Room air)        Physical Exam    General: Awake, alert, comfortable on room air, in no distress, tolerating oral secretions, interactive  Pulmonary: Lungs CTA B, no wheezing, no conversational dyspnea, deep inspiration does exacerbate her symptoms  Cardiac: +2 B/L regular radial pulses, no appreciated murmur on cardiac auscultation  Neuro: Symmetrical facial expressions on gross observation  Musculoskeletal: 5/5 B/L  strength, no limitation of active abduction of the left upper extremity but abduction of the left upper extremity does exacerbate chest pain mildly, TTP throughout the left anterior superior chest wall overlying the pectoralis region with no hematomas, no rash, no ecchymosis  Breast: Medical assistantVamshi was present as chaperone throughout exam, patient preferred her  to be present in the room, chronic B/L scarring from previous breast reduction, no TTP throughout the left breast with no appreciated masses and no axillary  lymphadenopathy  HEENT: No periorbital edema or erythema  Skin: No rash of the chest wall  Psych: Normal mood, normal affect    ED Course     Labs Reviewed   POCT ISTAT CHEM8 CARTRIDGE - Abnormal; Notable for the following components:       Result Value    ISTAT Glucose 214 (*)     ISTAT Creatinine 0.50 (*)     All other components within normal limits   D-DIMER (POC) - Normal   ISTAT TROPONIN - Normal   POCT CBC     Collected 4/23/2025 09:36       Status: Final result    0 Result Notes      Component  Ref Range & Units 4/23/25 0936   WBC IC  4.0 - 11.0 x10ˆ3/uL 6.3   RBC IC  3.80 - 5.30 X10ˆ6/uL 4.59   HGB IC  12.0 - 16.0 g/dL 13.8   HCT IC  35.0 - 48.0 % 42.2   MCV IC  80.0 - 100.0 fL 91.9   MCH IC  26.0 - 34.0 pg 30.1   MCHC IC  31.0 - 37.0 g/dL 32.7   PLT IC  150.0 - 450.0 X10ˆ3/uL 261.0   # Neutrophil  1.5 - 7.7 X10ˆ3/uL 2.9   # Lymphocyte  1.0 - 4.0 X10ˆ3/uL 2.9   # Mixed Cells  0.1 - 1.0 X10ˆ3/uL 0.5   Neutrophil %  % 47.2   Lymphocyte %  % 45.3   Mixed Cell %  % 7.5   Resulting [facility] ([facility])             Specimen Collected: 04/23/25 09:36 Last Resulted: 04/23/25 09:42     Collected 4/23/2025 09:44       Status: Final result    0 Result Notes      Component  Ref Range & Units 4/23/25 0944   ISTAT Sodium  136 - 145 mmol/L 140   ISTAT BUN  7 - 18 mg/dL 12   ISTAT Potassium  3.6 - 5.1 mmol/L 4.0   ISTAT Chloride  98 - 112 mmol/L 101   ISTAT Ionized Calcium  1.12 - 1.32 mmol/L 1.19   ISTAT Hematocrit  34 - 50 % 45   ISTAT Glucose  70 - 99 mg/dL 214 High    ISTAT TCO2  21 - 32 mmol/L 23   ISTAT Creatinine  0.55 - 1.02 mg/dL 0.50 Low    Comment: This test may exhibit falsely elevated results when a sample is collected from a patient who is presently taking Hydroxyurea. If patient is consuming Hydroxyurea, i-STAT creatinine analysis should be considered inaccurate and a sample should be referred to the Central Lab for analysis, if clinically indicated.   eGFR-Cr  >=60 mL/min/1.73m2 116   Resulting Agency  [facility] ([facility])             Specimen Collected: 04/23/25 09:44 Last Resulted: 04/23/25 09:45       Copy of radiology report of patient's chest x-ray:  Narrative  PROCEDURE: XR CHEST PA + LAT CHEST (CPT=71046)     COMPARISON: None.     INDICATIONS: Left sided chest pain beginning today.     TECHNIQUE:   Two views.       FINDINGS:  CARDIAC/VASC: Normal.  No cardiac silhouette abnormality or cardiomegaly.  Unremarkable pulmonary vasculature.    MEDIAST/LENOEL: No visible mass or adenopathy.  LUNGS/PLEURA: Normal.  No significant pulmonary parenchymal abnormalities.  No effusion or pleural thickening.    BONES: No fracture or visible bony lesion.  OTHER: Negative.                Impression  CONCLUSION: No acute cardiopulmonary abnormality.           Dictated by (CST): Wilner Leon MD on 4/23/2025 at 10:46 AM      Finalized by (CST): Wilner Leon MD on 4/23/2025 at 10:46 AM              Exam Ended: 04/23/25 10:27 Last Resulted: 04/23/25 10:46         EKG    Rate, intervals and axes as noted on EKG Report.  Rate: HR 85  Rhythm: Normal sinus rhythm, no ST elevation or T wave inversions, mild flattening in lead aVL, per review of EKG from 1/12/2022, there is report of normal sinus rhythm, but I cannot personally review the EKG due to computer issues  Reading: As above, normal sinus rhythm             MDM   Patient is awake, alert, comfortable on room air, in no distress, lungs CTAB with no wheezing with no sign of acute bronchospasm, there is reproducible TTP of the left pectoralis region with exacerbation with abduction of the left arm and deep inspiration consistent with potential pectoralis muscle spasm, had reported breast pain that has since resolved and on exam with chaperone present no appreciated breast masses or lymphadenopathy or tenderness, no sign of shingles, also consider potential atypical ACS and given shortness of breath consider potential PE and will assess with CBC for anemia, BMP for kidney function and  electrolytes, EKG and troponin, and D-dimer, and if D-dimer is negative will assess with chest x-ray to better assess lung fields and ribs, no blunt trauma to suspect rib fracture, pt is agreeable to this plan  -Per my personal review and interpretation of the patient's EKG there is normal sinus rhythm with no ST elevation no ST depression, no STEMI, flattening lead aVL, cannot personally review the EKG from 1/12/2022 but reading was normal sinus rhythm   -Patient's troponin is less than 0.02 which in the setting of normal EKG rules out ACS  -Patient's BMP shows normal kidney function and normal electrolytes, glucose is 214 with normal bicarbonate, per chart review of CMP from 12/6/2024 patient had glucose of 200 at that time and hemoglobin A1c was 8.4, patient states she did not yet take her metformin today, but did discuss that metformin is not that fast acting, and blood sugar was 200 in December, and may need reassessment of her diabetic management plan, and should follow-up closely with her primary care physician  - Patient's D-dimer is less than 200 which rules out PE  -Per my personal review and interpretation of the patient's chest x-ray there is normal lung expansion with no effusions and no consolidations, no pneumothoraces.  This is consistent with my review of the radiology report as copied above.  -Patient's repeat blood pressure is 130/80, did discuss initial elevated blood pressure reading could be due to pain, but to monitor closely and follow-up with her primary care physician.  -Patient received Tylenol in clinic today for pain control, not to take another dose until it has been 6 hours since the dose administered in clinic today, she states she cannot take NSAIDs and avoids them due to history of degenerative kidney.  -We discussed symptomatic management with rest, no heavy lifting, no straining, over-the-counter Tylenol if needed for pain control, and safe application of heat packs.  -Patient  prefers to return to work tomorrow, work note provided   - Patient is aware that she should follow up with her primary care physician due to elevated blood pressure reading, elevated blood sugar, and resolved breast pain as we do not have breast imaging available in immediate care.      Medical Decision Making  Amount and/or Complexity of Data Reviewed  Labs: ordered.  Radiology: ordered and independent interpretation performed.  ECG/medicine tests: ordered and independent interpretation performed.    Risk  OTC drugs.        Disposition and Plan     Clinical Impression:  1. Acute chest pain    2. Hyperglycemia    3. Elevated blood pressure reading         Disposition:  Discharge  4/23/2025 10:50 am    Follow-up:  Mckenna Feldman,   [address]  [address]  [facility] [address]  [phone]    In 3 days            Medications Prescribed:  Discharge Medication List as of 4/23/2025 10:56 AM            None

## 2025-04-23 NOTE — ED INITIAL ASSESSMENT (HPI)
Patient reports left sided chest tightness that woke her from sleep at 0330 this morning.  + shortness of breath.  Denies uri symptoms, nausea, recent travel.

## 2025-04-23 NOTE — DISCHARGE INSTRUCTIONS
Your EKG and blood work show no sign of cardiac injury and no sign of blood clot.  You have normal kidney function and normal electrolytes, your blood sugar is elevated and it is extremely important that you follow-up with your primary care physician for reassessment and for further recommendations especially given your history of diabetes.    Your blood pressure was also noted to be elevated today which can be in the setting of pain, but I also recommend follow-up with your primary care physician for reassessment and for further recommendations.    Your chest x-ray shows normal lung expansion with no signs of pneumonia.    Your exam is most consistent with likely muscle strain of the pectoralis region for which I recommend rest, no heavy lifting, no straining, application of heat pack, 3 times a day, 10 minutes each time, with a barrier such as a towel between the skin and the heat pack so as to prevent skin injury.  You received a dose of Tylenol in clinic today and cannot take another dose until it has been 6 hours since the dose administered in clinic today.    Given involvement in the breast, I do recommend follow-up with your primary care physician for reassessment and for further recommendations to ensure no underlying breast etiology as we do not have breast imaging available in the immediate care.    With any new, changing, or progressing signs or symptoms, I do recommend immediate reassessment.    With any difficulty breathing or severe pain, I recommend immediate assessment in the emergency department.

## 2025-04-24 LAB
ATRIAL RATE: 85 BPM
P AXIS: 61 DEGREES
P-R INTERVAL: 150 MS
Q-T INTERVAL: 398 MS
QRS DURATION: 80 MS
QTC CALCULATION (BEZET): 473 MS
R AXIS: 54 DEGREES
T AXIS: 56 DEGREES
VENTRICULAR RATE: 85 BPM

## (undated) NOTE — LETTER
Date & Time: 2/4/2020, 10:13 AM  Patient: Jonatan Padilla  Encounter Provider(s):    Monty Torres MD       To Whom It May Concern:    Roverto Jefferson was seen and treated in our department on 2/4/2020.  She should not return to work until [employer]

## (undated) NOTE — LETTER
Date & Time: 11/23/2020, 7:05 PM  Patient: Lynn Hoyos  Encounter Provider(s):    Chen Gross MD       To Whom It May Concern:    Ron Trinidad was seen and treated in our department on 11/23/2020.  Please excuse from work through 11/25/2

## (undated) NOTE — LETTER
[facility]  [address]  [phone]     Patient: Susan Keenan   YOB: 1975   Date of Visit: 11/19/2020     Dear Employer,        November 19, 2020    At [facility], we are taking special Persons infected with SARS-CoV-2 who never develop COVID-19 symptoms may discontinue isolation and other precautions 10 days after the date of their first positive RT-PCR test for SARS-CoV-2 RNA.     Persons who are asymptomatic but have been exposed, CDC r

## (undated) NOTE — LETTER
Date & Time: 12/11/2018, 9:59 AM  Patient: Sheryl Fleischer  Encounter Provider(s):    Anita Oconnor MD       To Whom It May Concern:    Galen Venegas was seen and treated in our department on 12/11/2018. She can return to work on 12/12/18.

## (undated) NOTE — LETTER
Preston Burk, :10/24/1975    CONSENT FOR PROCEDURE/SEDATION    1. I authorize the performance upon Preston Burk  the following: Incision and drainage     2.  I authorize Dr. Venita Taveras MD (and whomever is designated as the d Relationship to patient: ____________________________________________    Witness: _________________________________________ Date:___________     Physician Signature: _______________________________ Date:___________

## (undated) NOTE — LETTER
Date & Time: 1/30/2025, 10:45 AM  Patient: Geneva Parson  Encounter Provider(s):    Carolin Sandoval APRN       To Whom It May Concern:    Geneva Parson was seen and treated in our department on 1/30/2025. She can return to work 1/31/25.    If you have any questions or concerns, please do not hesitate to call.      Carolin Sandoval   _____________________________  Physician/APC Signature

## (undated) NOTE — LETTER
Date & Time: 9/16/2019, 9:08 AM  Patient: Lyubov Bernal  Encounter Provider(s):    Jessica Garcia MD       To Whom It May Concern:    Andrez Dalton was seen and treated in our department on 9/16/2019.      If you have any questions or concerns,

## (undated) NOTE — LETTER
19    RE: Su Rivasin    : 10/24/1975    To Whom It May Concern:    Our patient, Su Cui,      __X__Has received treatment in our office on ____2019_____________.        _____Has been hospitalized on the following dates ___

## (undated) NOTE — LETTER
Date & Time: 4/23/2025, 10:53 AM  Patient: Geneva Parson  Encounter Provider(s):    Isabel Robb DO       To Whom It May Concern:    Geneva Parson was seen and treated in our department on 4/23/2025. She can return to work tomorrow.      ____Isabel Robb DO_________________________  Physician/APC Signature

## (undated) NOTE — LETTER
Date & Time: 5/5/2020, 9:48 AM  Patient: Shelby Lees  Encounter Provider(s):    Davide Hall MD       To Whom It May Concern:    Marissa Dailey was seen and treated in our department on 5/5/2020.  She can return to work with these [employer]

## (undated) NOTE — LETTER
Date & Time: 7/29/2019, 5:22 PM  Patient: Sarita Cam  Encounter Provider(s):    Summer Mendoza MD       To Whom It May Concern:    Yong Boyd was seen and treated in our department on 7/29/2019.  She should not return to work until [facility]

## (undated) NOTE — LETTER
Date & Time: 8/11/2021, 11:08 AM  Patient: Arin Naylor  Encounter Provider(s):    MARY Zapien       To Whom It May Concern:    Mickey Szymanski was seen and treated in our department on 8/11/2021. She may return to work on 8/12/21.      If

## (undated) NOTE — LETTER
Date & Time: 1/12/2022, 6:32 PM  Patient: Gianna Canada  Encounter Provider(s):    JOVANA Stewart       To Whom It May Concern:    Chip Mendoza was seen and treated in our department on 1/12/2022.  She should not return to work until 01/1

## (undated) NOTE — LETTER
Date & Time: 4/24/2025, 8:35 AM  Patient: Geneva Parson  Encounter Provider(s):    Isabel Robb DO       To Whom It May Concern:    Geneva Parson was seen and treated in our department on 4/23/2025. She May return to work 4/25/025.    If you have any questions or concerns, please do not hesitate to call.        ______SIXTO Ortiz RN _______________________  Physician/APC Signature